# Patient Record
Sex: MALE | Race: BLACK OR AFRICAN AMERICAN | NOT HISPANIC OR LATINO | Employment: FULL TIME | URBAN - METROPOLITAN AREA
[De-identification: names, ages, dates, MRNs, and addresses within clinical notes are randomized per-mention and may not be internally consistent; named-entity substitution may affect disease eponyms.]

---

## 2017-04-30 DIAGNOSIS — E11.9 TYPE 2 DIABETES MELLITUS WITHOUT COMPLICATION: ICD-10-CM

## 2017-05-01 RX ORDER — BLOOD SUGAR DIAGNOSTIC
STRIP MISCELLANEOUS
Qty: 100 STRIP | Refills: 0 | Status: SHIPPED | OUTPATIENT
Start: 2017-05-01 | End: 2017-05-29 | Stop reason: SDUPTHER

## 2017-05-06 DIAGNOSIS — E11.9 TYPE 2 DIABETES MELLITUS WITHOUT COMPLICATION: ICD-10-CM

## 2017-05-07 RX ORDER — DAPAGLIFLOZIN 5 MG/1
TABLET, FILM COATED ORAL
Qty: 90 TABLET | Refills: 0 | Status: SHIPPED | OUTPATIENT
Start: 2017-05-07 | End: 2017-05-29 | Stop reason: SDUPTHER

## 2017-05-29 ENCOUNTER — OFFICE VISIT (OUTPATIENT)
Dept: FAMILY MEDICINE | Facility: HOSPITAL | Age: 42
End: 2017-05-29
Attending: FAMILY MEDICINE
Payer: COMMERCIAL

## 2017-05-29 VITALS
BODY MASS INDEX: 38.13 KG/M2 | DIASTOLIC BLOOD PRESSURE: 93 MMHG | HEART RATE: 89 BPM | HEIGHT: 67 IN | WEIGHT: 242.94 LBS | SYSTOLIC BLOOD PRESSURE: 134 MMHG

## 2017-05-29 DIAGNOSIS — I10 ESSENTIAL HYPERTENSION: ICD-10-CM

## 2017-05-29 DIAGNOSIS — E11.9 TYPE 2 DIABETES MELLITUS WITHOUT COMPLICATION, WITHOUT LONG-TERM CURRENT USE OF INSULIN: ICD-10-CM

## 2017-05-29 DIAGNOSIS — E78.5 HYPERLIPIDEMIA, UNSPECIFIED HYPERLIPIDEMIA TYPE: ICD-10-CM

## 2017-05-29 PROCEDURE — 99213 OFFICE O/P EST LOW 20 MIN: CPT | Performed by: FAMILY MEDICINE

## 2017-05-29 RX ORDER — METFORMIN HYDROCHLORIDE 1000 MG/1
1000 TABLET ORAL 2 TIMES DAILY WITH MEALS
Qty: 180 TABLET | Refills: 3 | Status: SHIPPED | OUTPATIENT
Start: 2017-05-29 | End: 2018-06-21 | Stop reason: SDUPTHER

## 2017-05-29 RX ORDER — LISINOPRIL 40 MG/1
40 TABLET ORAL DAILY
Qty: 90 TABLET | Refills: 3 | Status: SHIPPED | OUTPATIENT
Start: 2017-05-29 | End: 2018-06-21 | Stop reason: SDUPTHER

## 2017-05-29 RX ORDER — HYDROCHLOROTHIAZIDE 25 MG/1
25 TABLET ORAL DAILY
Qty: 90 TABLET | Refills: 3 | Status: SHIPPED | OUTPATIENT
Start: 2017-05-29 | End: 2018-06-21 | Stop reason: SDUPTHER

## 2017-05-29 RX ORDER — ATORVASTATIN CALCIUM 40 MG/1
40 TABLET, FILM COATED ORAL DAILY
Qty: 90 TABLET | Refills: 3 | Status: SHIPPED | OUTPATIENT
Start: 2017-05-29 | End: 2018-06-21

## 2017-05-29 RX ORDER — DAPAGLIFLOZIN 5 MG/1
10 TABLET, FILM COATED ORAL DAILY
Qty: 180 TABLET | Refills: 3 | Status: SHIPPED | OUTPATIENT
Start: 2017-05-29 | End: 2018-06-21 | Stop reason: SDUPTHER

## 2017-05-29 NOTE — PROGRESS NOTES
Subjective:       Patient ID: Pete Castelan is a 42 y.o. male.    Chief Complaint: Annual Exam and Medication Refill    HPI   41 yo male, w/ h/o T2DM, HTN, HLD, presents for medication refill. Patient states he is doing well and would like his medications. His last labs was about 1 year. His A1c was 8.9. BP is stable at home. Denies any complaints at this time.    Review of Systems   Constitutional: Negative for chills and fever.   HENT: Negative for congestion.    Respiratory: Negative for shortness of breath and wheezing.    Cardiovascular: Negative for chest pain, palpitations and leg swelling.   Gastrointestinal: Negative for abdominal pain, constipation, diarrhea, nausea and vomiting.   Genitourinary: Negative for dysuria.   Neurological: Negative for dizziness and headaches.       Objective:      Vitals:    05/29/17 1533   BP: (!) 134/93   Pulse: 89     Physical Exam   Constitutional: He is oriented to person, place, and time. He appears well-developed and well-nourished.   HENT:   Head: Normocephalic and atraumatic.   Neck: Neck supple.   Cardiovascular: Normal rate, regular rhythm, normal heart sounds and intact distal pulses.  Exam reveals no gallop and no friction rub.    No murmur heard.  Pulmonary/Chest: Effort normal and breath sounds normal. He has no wheezes. He has no rales.   Abdominal: Soft. Bowel sounds are normal. He exhibits no distension. There is no tenderness.   Musculoskeletal: He exhibits no edema.   Neurological: He is alert and oriented to person, place, and time.   Skin: Skin is warm and dry.   Psychiatric: He has a normal mood and affect. His behavior is normal. Judgment and thought content normal.       Assessment:       1. Hyperlipidemia, unspecified hyperlipidemia type    2. Type 2 diabetes mellitus without complication, without long-term current use of insulin    3. Essential hypertension        Plan:       Hyperlipidemia, unspecified hyperlipidemia type  -     atorvastatin  (LIPITOR) 40 MG tablet; Take 1 tablet (40 mg total) by mouth once daily.  Dispense: 90 tablet; Refill: 3    Type 2 diabetes mellitus without complication, without long-term current use of insulin  -     Hemoglobin A1c; Future; Expected date: 05/29/2017  -     dapagliflozin (FARXIGA) 5 mg Tab tablet; Take 2 tablets (10 mg total) by mouth once daily.  Dispense: 180 tablet; Refill: 3  -     metformin (GLUCOPHAGE) 1000 MG tablet; Take 1 tablet (1,000 mg total) by mouth 2 (two) times daily with meals.  Dispense: 180 tablet; Refill: 3  -     blood sugar diagnostic (ONETOUCH ULTRA TEST) Strp; TEST ONCE DAILY  Dispense: 100 strip; Refill: 3  -     Hemoglobin A1c; Future; Expected date: 08/27/2017    Essential hypertension  -     CBC auto differential; Future; Expected date: 05/29/2017  -     Comprehensive metabolic panel; Future; Expected date: 05/29/2017  -     TSH; Future; Expected date: 05/29/2017  -     Lipid panel; Future; Expected date: 05/29/2017  -     hydrochlorothiazide (HYDRODIURIL) 25 MG tablet; Take 1 tablet (25 mg total) by mouth once daily.  Dispense: 90 tablet; Refill: 3  -     lisinopril (PRINIVIL,ZESTRIL) 40 MG tablet; Take 1 tablet (40 mg total) by mouth once daily.  Dispense: 90 tablet; Refill: 3      Return in about 3 months (around 8/29/2017).

## 2017-05-30 NOTE — PROGRESS NOTES
I assume primary medical responsibility for this patient, I have reviewed the case history, findings, diagnosis and treatment plan with the resident and agree that the care is reasonable and necessary. This service has been performed by a resident without the presence of a teaching physician under the primary care exception  [address]  5/30/2017

## 2017-08-08 DIAGNOSIS — I10 ESSENTIAL HYPERTENSION: ICD-10-CM

## 2017-08-17 RX ORDER — LISINOPRIL 40 MG/1
TABLET ORAL
Qty: 90 TABLET | Refills: 0 | Status: SHIPPED | OUTPATIENT
Start: 2017-08-17 | End: 2017-10-31 | Stop reason: SDUPTHER

## 2017-10-26 PROCEDURE — 96374 THER/PROPH/DIAG INJ IV PUSH: CPT

## 2017-10-26 PROCEDURE — 93005 ELECTROCARDIOGRAM TRACING: CPT

## 2017-10-26 PROCEDURE — 99285 EMERGENCY DEPT VISIT HI MDM: CPT | Mod: ,,, | Performed by: EMERGENCY MEDICINE

## 2017-10-26 PROCEDURE — 25605 CLTX DST RDL FX/EPHYS SEP W/: CPT | Mod: RT

## 2017-10-26 PROCEDURE — 99285 EMERGENCY DEPT VISIT HI MDM: CPT | Mod: 25,27

## 2017-10-26 PROCEDURE — 99284 EMERGENCY DEPT VISIT MOD MDM: CPT

## 2017-10-27 ENCOUNTER — TELEPHONE (OUTPATIENT)
Dept: ORTHOPEDICS | Facility: CLINIC | Age: 42
End: 2017-10-27

## 2017-10-27 ENCOUNTER — HOSPITAL ENCOUNTER (EMERGENCY)
Facility: HOSPITAL | Age: 42
Discharge: HOME | End: 2017-10-27
Attending: EMERGENCY MEDICINE
Payer: COMMERCIAL

## 2017-10-27 VITALS
HEART RATE: 90 BPM | SYSTOLIC BLOOD PRESSURE: 120 MMHG | BODY MASS INDEX: 37.67 KG/M2 | DIASTOLIC BLOOD PRESSURE: 66 MMHG | OXYGEN SATURATION: 96 % | TEMPERATURE: 98 F | HEIGHT: 67 IN | RESPIRATION RATE: 20 BRPM | WEIGHT: 240 LBS

## 2017-10-27 DIAGNOSIS — Z01.818 PRE-OP EVALUATION: ICD-10-CM

## 2017-10-27 DIAGNOSIS — S52.501A CLOSED FRACTURE OF DISTAL END OF RIGHT RADIUS, UNSPECIFIED FRACTURE MORPHOLOGY, INITIAL ENCOUNTER: Primary | ICD-10-CM
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
## 2017-10-27 DIAGNOSIS — M25.531 RIGHT WRIST PAIN: ICD-10-CM

## 2017-10-27 LAB
ALBUMIN SERPL BCP-MCNC: 3.4 G/DL
ALP SERPL-CCNC: 58 U/L
ALT SERPL W/O P-5'-P-CCNC: 14 U/L
ANION GAP SERPL CALC-SCNC: 12 MMOL/L
AST SERPL-CCNC: 22 U/L
BASOPHILS # BLD AUTO: 0.02 K/UL
BASOPHILS NFR BLD: 0.2 %
BILIRUB SERPL-MCNC: 0.3 MG/DL
BUN SERPL-MCNC: 20 MG/DL
CALCIUM SERPL-MCNC: 8.7 MG/DL
CHLORIDE SERPL-SCNC: 108 MMOL/L
CO2 SERPL-SCNC: 19 MMOL/L
CREAT SERPL-MCNC: 1.2 MG/DL
DIFFERENTIAL METHOD: ABNORMAL
EOSINOPHIL # BLD AUTO: 0.4 K/UL
EOSINOPHIL NFR BLD: 3.4 %
ERYTHROCYTE [DISTWIDTH] IN BLOOD BY AUTOMATED COUNT: 13.7 %
EST. GFR  (AFRICAN AMERICAN): >60 ML/MIN/1.73 M^2
EST. GFR  (NON AFRICAN AMERICAN): >60 ML/MIN/1.73 M^2
GLUCOSE SERPL-MCNC: 168 MG/DL
HCT VFR BLD AUTO: 37.6 %
HGB BLD-MCNC: 11.9 G/DL
IMM GRANULOCYTES # BLD AUTO: 0.03 K/UL
IMM GRANULOCYTES NFR BLD AUTO: 0.3 %
LYMPHOCYTES # BLD AUTO: 1.4 K/UL
LYMPHOCYTES NFR BLD: 13.2 %
MCH RBC QN AUTO: 26.6 PG
MCHC RBC AUTO-ENTMCNC: 31.6 G/DL
MCV RBC AUTO: 84 FL
MONOCYTES # BLD AUTO: 0.7 K/UL
MONOCYTES NFR BLD: 6.7 %
NEUTROPHILS # BLD AUTO: 8 K/UL
NEUTROPHILS NFR BLD: 76.2 %
NRBC BLD-RTO: 0 /100 WBC
PLATELET # BLD AUTO: 253 K/UL
PMV BLD AUTO: 10.3 FL
POTASSIUM SERPL-SCNC: 4.3 MMOL/L
PROT SERPL-MCNC: 7 G/DL
RBC # BLD AUTO: 4.47 M/UL
SODIUM SERPL-SCNC: 139 MMOL/L
WBC # BLD AUTO: 10.43 K/UL

## 2017-10-27 PROCEDURE — 80053 COMPREHEN METABOLIC PANEL: CPT

## 2017-10-27 PROCEDURE — 93010 ELECTROCARDIOGRAM REPORT: CPT | Mod: ,,, | Performed by: INTERNAL MEDICINE

## 2017-10-27 PROCEDURE — 63600175 PHARM REV CODE 636 W HCPCS: Performed by: ORTHOPAEDIC SURGERY

## 2017-10-27 PROCEDURE — 25000003 PHARM REV CODE 250: Performed by: EMERGENCY MEDICINE

## 2017-10-27 PROCEDURE — 85025 COMPLETE CBC W/AUTO DIFF WBC: CPT

## 2017-10-27 PROCEDURE — 25605 CLTX DST RDL FX/EPHYS SEP W/: CPT | Mod: RT

## 2017-10-27 RX ORDER — HYDROCODONE BITARTRATE AND ACETAMINOPHEN 5; 325 MG/1; MG/1
1 TABLET ORAL
Status: COMPLETED | OUTPATIENT
Start: 2017-10-27 | End: 2017-10-27

## 2017-10-27 RX ORDER — LORAZEPAM 2 MG/ML
4 INJECTION INTRAMUSCULAR ONCE
Status: COMPLETED | OUTPATIENT
Start: 2017-10-27 | End: 2017-10-27

## 2017-10-27 RX ORDER — HYDROCODONE BITARTRATE AND ACETAMINOPHEN 5; 325 MG/1; MG/1
1 TABLET ORAL EVERY 4 HOURS PRN
Qty: 18 TABLET | Refills: 0 | Status: SHIPPED | OUTPATIENT
Start: 2017-10-27 | End: 2019-03-04

## 2017-10-27 RX ADMIN — HYDROCODONE BITARTRATE AND ACETAMINOPHEN 1 TABLET: 5; 325 TABLET ORAL at 01:10

## 2017-10-27 RX ADMIN — LORAZEPAM 4 MG: 2 INJECTION INTRAMUSCULAR; INTRAVENOUS at 03:10
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
## 2017-10-27 NOTE — TELEPHONE ENCOUNTER
----- Message from Mary Anne Myers MD sent at 10/27/2017  4:51 AM CDT -----  Pt needs f/u in one week for operative distal radius fracture.
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
## 2017-10-30 ENCOUNTER — OFFICE VISIT (OUTPATIENT)
Dept: ORTHOPEDICS | Facility: CLINIC | Age: 42
End: 2017-10-30
Payer: COMMERCIAL

## 2017-10-30 VITALS
WEIGHT: 240 LBS | RESPIRATION RATE: 18 BRPM | HEIGHT: 67 IN | HEART RATE: 88 BPM | DIASTOLIC BLOOD PRESSURE: 93 MMHG | SYSTOLIC BLOOD PRESSURE: 144 MMHG | BODY MASS INDEX: 37.67 KG/M2

## 2017-10-30 DIAGNOSIS — S52.124A CLOSED NONDISPLACED FRACTURE OF HEAD OF RIGHT RADIUS, INITIAL ENCOUNTER: ICD-10-CM

## 2017-10-30 DIAGNOSIS — S52.501A CLOSED FRACTURE OF DISTAL END OF RIGHT RADIUS, UNSPECIFIED FRACTURE MORPHOLOGY, INITIAL ENCOUNTER: Primary | ICD-10-CM

## 2017-10-30 DIAGNOSIS — S52.101A CLOSED FRACTURE OF PROXIMAL END OF RIGHT RADIUS, UNSPECIFIED FRACTURE MORPHOLOGY, INITIAL ENCOUNTER: Primary | ICD-10-CM

## 2017-10-30 PROCEDURE — 99204 OFFICE O/P NEW MOD 45 MIN: CPT | Mod: 57,S$GLB,, | Performed by: ORTHOPAEDIC SURGERY

## 2017-10-30 PROCEDURE — 99999 PR PBB SHADOW E&M-EST. PATIENT-LVL III: CPT | Mod: PBBFAC,,, | Performed by: ORTHOPAEDIC SURGERY

## 2017-10-31 ENCOUNTER — TELEPHONE (OUTPATIENT)
Dept: ORTHOPEDICS | Facility: CLINIC | Age: 42
End: 2017-10-31

## 2017-10-31 RX ORDER — ACETAMINOPHEN 500 MG
1000 TABLET ORAL EVERY 6 HOURS PRN
COMMUNITY
End: 2019-03-04

## 2017-10-31 RX ORDER — ASCORBIC ACID 500 MG
500 TABLET ORAL EVERY MORNING
COMMUNITY

## 2017-10-31 RX ORDER — IBUPROFEN 200 MG
800 TABLET ORAL EVERY MORNING
Status: ON HOLD | COMMUNITY
End: 2017-11-01 | Stop reason: HOSPADM

## 2017-10-31 NOTE — TELEPHONE ENCOUNTER
----- Message from Corine Acosta sent at 10/31/2017  3:46 PM CDT -----  Contact: pt  _  1st Request  _  2nd Request  _  3rd Request        Who: pt    Why: Requesting a call back in regards to needs information on surgery. Please call pt     What Number to Call Back:[phone]    When to Expect a call back: (Within 24 hours)    Please return the call at earliest convenience. Thanks!

## 2017-10-31 NOTE — PRE-PROCEDURE INSTRUCTIONS
"Spoke with Patient.  NPO, medication, and pre-op instructions reviewed.  Denies previous problems with Anesthesia.  Stated that his morning glucose readings have been "ok" ranging from 150 - 180.  Did not adjust his evening dose of Metformin the evening before surgery.  Has been taking 800 mg of Ibuprofen every morning.  Instructed to use Extra Strength Tylenol as needed.  Instructed that he can take two tablets the morning of surgery.  Verbalized understanding of instructions.    "
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
## 2017-10-31 NOTE — TELEPHONE ENCOUNTER
Received a call from surgery center while NoeFlip was on the other line to get his arrival time for surgery tomorrow @ 7:00am.

## 2017-11-01 ENCOUNTER — ANESTHESIA (OUTPATIENT)
Dept: SURGERY | Facility: HOSPITAL | Age: 42
End: 2017-11-01
Payer: COMMERCIAL

## 2017-11-01 ENCOUNTER — ANESTHESIA EVENT (OUTPATIENT)
Dept: SURGERY | Facility: HOSPITAL | Age: 42
End: 2017-11-01
Payer: COMMERCIAL

## 2017-11-01 ENCOUNTER — HOSPITAL ENCOUNTER (OUTPATIENT)
Facility: HOSPITAL | Age: 42
Discharge: HOME | End: 2017-11-01
Attending: ORTHOPAEDIC SURGERY | Admitting: ORTHOPAEDIC SURGERY
Payer: COMMERCIAL

## 2017-11-01 ENCOUNTER — SURGERY (OUTPATIENT)
Age: 42
End: 2017-11-01

## 2017-11-01 VITALS
WEIGHT: 240 LBS | RESPIRATION RATE: 18 BRPM | BODY MASS INDEX: 37.67 KG/M2 | DIASTOLIC BLOOD PRESSURE: 81 MMHG | HEART RATE: 85 BPM | SYSTOLIC BLOOD PRESSURE: 129 MMHG | HEIGHT: 67 IN | OXYGEN SATURATION: 99 % | TEMPERATURE: 98 F

## 2017-11-01 DIAGNOSIS — S52.501A DISTAL RADIUS FRACTURE, RIGHT: ICD-10-CM

## 2017-11-01 LAB
POCT GLUCOSE: 222 MG/DL (ref 70–110)
POCT GLUCOSE: 247 MG/DL (ref 70–110)

## 2017-11-01 PROCEDURE — C1769 GUIDE WIRE: HCPCS | Performed by: ORTHOPAEDIC SURGERY

## 2017-11-01 PROCEDURE — 25000003 PHARM REV CODE 250: Performed by: NURSE ANESTHETIST, CERTIFIED REGISTERED

## 2017-11-01 PROCEDURE — 37000008 HC ANESTHESIA 1ST 15 MINUTES: Performed by: ORTHOPAEDIC SURGERY

## 2017-11-01 PROCEDURE — 25609 OPTX DST RD XART FX/EP SEP3+: CPT | Mod: RT,,, | Performed by: ORTHOPAEDIC SURGERY

## 2017-11-01 PROCEDURE — 63600175 PHARM REV CODE 636 W HCPCS: Performed by: NURSE ANESTHETIST, CERTIFIED REGISTERED

## 2017-11-01 PROCEDURE — 25000003 PHARM REV CODE 250: Performed by: ORTHOPAEDIC SURGERY

## 2017-11-01 PROCEDURE — 27201423 OPTIME MED/SURG SUP & DEVICES STERILE SUPPLY: Performed by: ORTHOPAEDIC SURGERY

## 2017-11-01 PROCEDURE — 82962 GLUCOSE BLOOD TEST: CPT | Performed by: ORTHOPAEDIC SURGERY

## 2017-11-01 PROCEDURE — 37000009 HC ANESTHESIA EA ADD 15 MINS: Performed by: ORTHOPAEDIC SURGERY

## 2017-11-01 PROCEDURE — 36000711: Performed by: ORTHOPAEDIC SURGERY

## 2017-11-01 PROCEDURE — 76942 ECHO GUIDE FOR BIOPSY: CPT | Performed by: ANESTHESIOLOGY

## 2017-11-01 PROCEDURE — 36000710: Performed by: ORTHOPAEDIC SURGERY

## 2017-11-01 PROCEDURE — 63600175 PHARM REV CODE 636 W HCPCS: Performed by: ORTHOPAEDIC SURGERY

## 2017-11-01 PROCEDURE — 64415 NJX AA&/STRD BRCH PLXS IMG: CPT | Mod: 59,RT,, | Performed by: ANESTHESIOLOGY

## 2017-11-01 PROCEDURE — D9220A PRA ANESTHESIA: Mod: CRNA,,, | Performed by: NURSE ANESTHETIST, CERTIFIED REGISTERED

## 2017-11-01 PROCEDURE — 63600175 PHARM REV CODE 636 W HCPCS: Performed by: ANESTHESIOLOGY

## 2017-11-01 PROCEDURE — 71000033 HC RECOVERY, INTIAL HOUR: Performed by: ORTHOPAEDIC SURGERY

## 2017-11-01 PROCEDURE — C1713 ANCHOR/SCREW BN/BN,TIS/BN: HCPCS | Performed by: ORTHOPAEDIC SURGERY

## 2017-11-01 PROCEDURE — 71000015 HC POSTOP RECOV 1ST HR: Performed by: ORTHOPAEDIC SURGERY

## 2017-11-01 PROCEDURE — D9220A PRA ANESTHESIA: Mod: ANES,,, | Performed by: ANESTHESIOLOGY

## 2017-11-01 DEVICE — SCREW BNE N LOK HEXLB 3.5X14: Type: IMPLANTABLE DEVICE | Site: WRIST | Status: FUNCTIONAL

## 2017-11-01 DEVICE — SCREW BONE 2.3 X 18MM: Type: IMPLANTABLE DEVICE | Site: WRIST | Status: FUNCTIONAL

## 2017-11-01 DEVICE — SCREW BONE LOK CONG 2.3X24MM: Type: IMPLANTABLE DEVICE | Site: WRIST | Status: FUNCTIONAL

## 2017-11-01 DEVICE — PLATE BONE DST VOLAR RAD STND: Type: IMPLANTABLE DEVICE | Site: WRIST | Status: FUNCTIONAL

## 2017-11-01 DEVICE — SCREW BONE LOK CONG 2.3X22MM: Type: IMPLANTABLE DEVICE | Site: WRIST | Status: FUNCTIONAL

## 2017-11-01 DEVICE — SCREW BONE NL HEXALOBE 3.5 X 1: Type: IMPLANTABLE DEVICE | Site: WRIST | Status: FUNCTIONAL

## 2017-11-01 RX ORDER — BACITRACIN ZINC 500 UNIT/G
OINTMENT (GRAM) TOPICAL
Status: DISCONTINUED | OUTPATIENT
Start: 2017-11-01 | End: 2017-11-01 | Stop reason: HOSPADM

## 2017-11-01 RX ORDER — PHENYLEPHRINE HYDROCHLORIDE 10 MG/ML
INJECTION INTRAVENOUS
Status: DISCONTINUED | OUTPATIENT
Start: 2017-11-01 | End: 2017-11-01

## 2017-11-01 RX ORDER — LIDOCAINE HCL/PF 100 MG/5ML
SYRINGE (ML) INTRAVENOUS
Status: DISCONTINUED | OUTPATIENT
Start: 2017-11-01 | End: 2017-11-01

## 2017-11-01 RX ORDER — PROPOFOL 10 MG/ML
VIAL (ML) INTRAVENOUS CONTINUOUS PRN
Status: DISCONTINUED | OUTPATIENT
Start: 2017-11-01 | End: 2017-11-01

## 2017-11-01 RX ORDER — BACITRACIN 500 [USP'U]/G
OINTMENT TOPICAL
Status: DISCONTINUED
Start: 2017-11-01 | End: 2017-11-01 | Stop reason: HOSPADM

## 2017-11-01 RX ORDER — MIDAZOLAM HYDROCHLORIDE 1 MG/ML
0.5 INJECTION INTRAMUSCULAR; INTRAVENOUS EVERY 5 MIN PRN
Status: DISCONTINUED | OUTPATIENT
Start: 2017-11-01 | End: 2017-11-01

## 2017-11-01 RX ORDER — PROPOFOL 10 MG/ML
VIAL (ML) INTRAVENOUS
Status: DISCONTINUED | OUTPATIENT
Start: 2017-11-01 | End: 2017-11-01

## 2017-11-01 RX ORDER — MIDAZOLAM HYDROCHLORIDE 1 MG/ML
INJECTION, SOLUTION INTRAMUSCULAR; INTRAVENOUS
Status: DISCONTINUED | OUTPATIENT
Start: 2017-11-01 | End: 2017-11-01

## 2017-11-01 RX ORDER — ONDANSETRON 2 MG/ML
4 INJECTION INTRAMUSCULAR; INTRAVENOUS EVERY 12 HOURS PRN
Status: DISCONTINUED | OUTPATIENT
Start: 2017-11-01 | End: 2017-11-01 | Stop reason: HOSPADM

## 2017-11-01 RX ORDER — MIDAZOLAM HYDROCHLORIDE 1 MG/ML
0.5 INJECTION INTRAMUSCULAR; INTRAVENOUS EVERY 5 MIN PRN
Status: DISCONTINUED | OUTPATIENT
Start: 2017-11-01 | End: 2017-11-01 | Stop reason: HOSPADM

## 2017-11-01 RX ORDER — MORPHINE SULFATE 2 MG/ML
3 INJECTION, SOLUTION INTRAMUSCULAR; INTRAVENOUS
Status: DISCONTINUED | OUTPATIENT
Start: 2017-11-01 | End: 2017-11-01 | Stop reason: HOSPADM

## 2017-11-01 RX ORDER — SODIUM CHLORIDE 0.9 % (FLUSH) 0.9 %
3 SYRINGE (ML) INJECTION
Status: DISCONTINUED | OUTPATIENT
Start: 2017-11-01 | End: 2017-11-01 | Stop reason: HOSPADM

## 2017-11-01 RX ORDER — HYDROCODONE BITARTRATE AND ACETAMINOPHEN 5; 325 MG/1; MG/1
1 TABLET ORAL EVERY 4 HOURS PRN
Status: DISCONTINUED | OUTPATIENT
Start: 2017-11-01 | End: 2017-11-01 | Stop reason: HOSPADM

## 2017-11-01 RX ORDER — FENTANYL CITRATE 50 UG/ML
25 INJECTION, SOLUTION INTRAMUSCULAR; INTRAVENOUS EVERY 5 MIN PRN
Status: DISCONTINUED | OUTPATIENT
Start: 2017-11-01 | End: 2017-11-01

## 2017-11-01 RX ORDER — SODIUM CHLORIDE 9 MG/ML
INJECTION, SOLUTION INTRAVENOUS CONTINUOUS
Status: DISCONTINUED | OUTPATIENT
Start: 2017-11-01 | End: 2017-11-01 | Stop reason: HOSPADM

## 2017-11-01 RX ORDER — ONDANSETRON 2 MG/ML
INJECTION INTRAMUSCULAR; INTRAVENOUS
Status: DISCONTINUED | OUTPATIENT
Start: 2017-11-01 | End: 2017-11-01

## 2017-11-01 RX ORDER — FENTANYL CITRATE 50 UG/ML
25 INJECTION, SOLUTION INTRAMUSCULAR; INTRAVENOUS EVERY 5 MIN PRN
Status: DISCONTINUED | OUTPATIENT
Start: 2017-11-01 | End: 2017-11-01 | Stop reason: HOSPADM

## 2017-11-01 RX ORDER — MUPIROCIN 20 MG/G
OINTMENT TOPICAL
Status: DISCONTINUED | OUTPATIENT
Start: 2017-11-01 | End: 2017-11-01 | Stop reason: HOSPADM

## 2017-11-01 RX ORDER — MUPIROCIN 20 MG/G
1 OINTMENT TOPICAL 2 TIMES DAILY
Status: DISCONTINUED | OUTPATIENT
Start: 2017-11-01 | End: 2017-11-01 | Stop reason: HOSPADM

## 2017-11-01 RX ORDER — OXYCODONE AND ACETAMINOPHEN 5; 325 MG/1; MG/1
1 TABLET ORAL EVERY 4 HOURS PRN
Qty: 45 TABLET | Refills: 0 | Status: SHIPPED | OUTPATIENT
Start: 2017-11-01 | End: 2019-03-04

## 2017-11-01 RX ORDER — LIDOCAINE HYDROCHLORIDE 10 MG/ML
1 INJECTION, SOLUTION EPIDURAL; INFILTRATION; INTRACAUDAL; PERINEURAL ONCE
Status: COMPLETED | OUTPATIENT
Start: 2017-11-01 | End: 2017-11-01

## 2017-11-01 RX ADMIN — PHENYLEPHRINE HYDROCHLORIDE 100 MCG: 10 INJECTION INTRAVENOUS at 10:11

## 2017-11-01 RX ADMIN — ONDANSETRON 4 MG: 2 INJECTION INTRAMUSCULAR; INTRAVENOUS at 09:11

## 2017-11-01 RX ADMIN — BACITRACIN ZINC 1 TUBE: 500 OINTMENT TOPICAL at 10:11

## 2017-11-01 RX ADMIN — FENTANYL CITRATE 50 MCG: 50 INJECTION INTRAMUSCULAR; INTRAVENOUS at 08:11

## 2017-11-01 RX ADMIN — SODIUM CHLORIDE: 0.9 INJECTION, SOLUTION INTRAVENOUS at 08:11

## 2017-11-01 RX ADMIN — PROPOFOL 40 MG: 10 INJECTION, EMULSION INTRAVENOUS at 09:11

## 2017-11-01 RX ADMIN — LIDOCAINE HYDROCHLORIDE 50 MG: 20 INJECTION, SOLUTION INTRAVENOUS at 09:11

## 2017-11-01 RX ADMIN — MIDAZOLAM HYDROCHLORIDE 2 MG: 1 INJECTION, SOLUTION INTRAMUSCULAR; INTRAVENOUS at 08:11

## 2017-11-01 RX ADMIN — SODIUM CHLORIDE, SODIUM GLUCONATE, SODIUM ACETATE, POTASSIUM CHLORIDE, MAGNESIUM CHLORIDE, SODIUM PHOSPHATE, DIBASIC, AND POTASSIUM PHOSPHATE: .53; .5; .37; .037; .03; .012; .00082 INJECTION, SOLUTION INTRAVENOUS at 09:11

## 2017-11-01 RX ADMIN — VANCOMYCIN HYDROCHLORIDE 1000 MG: 1 INJECTION, POWDER, LYOPHILIZED, FOR SOLUTION INTRAVENOUS at 08:11

## 2017-11-01 RX ADMIN — PROPOFOL 100 MCG/KG/MIN: 10 INJECTION, EMULSION INTRAVENOUS at 09:11

## 2017-11-01 RX ADMIN — MUPIROCIN: 20 OINTMENT TOPICAL at 08:11

## 2017-11-01 RX ADMIN — LIDOCAINE HYDROCHLORIDE 1 MG: 10 INJECTION, SOLUTION EPIDURAL; INFILTRATION; INTRACAUDAL; PERINEURAL at 08:11
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
## 2017-11-01 NOTE — PLAN OF CARE
D/C orders noted. Pt. to see Opthalmology today. Pt. will schedule her own appt. for PCP at [facility].        11/01/17 1312   Final Note   Assessment Type Final Discharge Note   Discharge Disposition Home   Hospital Follow Up  Appt(s) scheduled? Yes   Discharge plans and expectations educations in teach back method with documentation complete? Yes   Right Care Referral Info   Post Acute Recommendation No Care

## 2017-11-01 NOTE — BRIEF OP NOTE
[facility]  Brief Operative Note     SUMMARY     Surgery Date: 11/1/2017     Surgeon(s) and Role:     * Melanie Betancourt MD - Primary     * Mary Anne Myers MD - Resident - Assisting        Pre-op Diagnosis:  Closed nondisplaced fracture of head of right radius, initial encounter [S52.124A]    Post-op Diagnosis:  Post-Op Diagnosis Codes:     * Closed nondisplaced fracture of head of right radius, initial encounter [S52.124A]    Procedure(s) (LRB):  OPEN REDUCTION INTERNAL FIXATION-RADIUS - RIGHT (Right)    Anesthesia: Regional    Description of the findings of the procedure: see op note    Findings/Key Components: see op note    Estimated Blood Loss: * No values recorded between 11/1/2017  9:32 AM and 11/1/2017 10:49 AM *         Specimens:   Specimen (12h ago through future)    None          Discharge Note    SUMMARY     Admit Date: 11/1/2017    Discharge Date and Time:  11/01/2017 10:55 AM    Hospital Course (synopsis of major diagnoses, care, treatment, and services provided during the course of the hospital stay): Patient presented for above procedure.  Tolerated it well and was discharged home POD0 after voiding, tolerating diet, ambulating, pain controlled.  Discharge instructions, follow-up appointment, and med rec are below.       Final Diagnosis: Post-Op Diagnosis Codes:     * Closed nondisplaced fracture of head of right radius, initial encounter [S52.124A]    Disposition: Home or Self Care    Follow Up/Patient Instructions: f/u in orthopedic hand clinic in 2 weeks    Medications:  Reconciled Home Medications:   Current Discharge Medication List      CONTINUE these medications which have NOT CHANGED    Details   acetaminophen (TYLENOL) 500 MG tablet Take 1,000 mg by mouth every 6 (six) hours as needed for Pain.      ascorbic acid, vitamin C, (VITAMIN C) 500 MG tablet Take 500 mg by mouth every morning.      atorvastatin (LIPITOR) 40 MG tablet Take 1 tablet (40 mg total) by  mouth once daily.  Qty: 90 tablet, Refills: 3    Associated Diagnoses: Hyperlipidemia, unspecified hyperlipidemia type      blood sugar diagnostic (ONETOUCH ULTRA TEST) Strp TEST ONCE DAILY  Qty: 100 strip, Refills: 3    Associated Diagnoses: Type 2 diabetes mellitus without complication, without long-term current use of insulin      dapagliflozin (FARXIGA) 5 mg Tab tablet Take 2 tablets (10 mg total) by mouth once daily.  Qty: 180 tablet, Refills: 3    Associated Diagnoses: Type 2 diabetes mellitus without complication, without long-term current use of insulin      hydrochlorothiazide (HYDRODIURIL) 25 MG tablet Take 1 tablet (25 mg total) by mouth once daily.  Qty: 90 tablet, Refills: 3    Associated Diagnoses: Essential hypertension      hydrocodone-acetaminophen 5-325mg (NORCO) 5-325 mg per tablet Take 1 tablet by mouth every 4 (four) hours as needed for Pain.  Qty: 18 tablet, Refills: 0      ibuprofen (ADVIL,MOTRIN) 200 MG tablet Take 800 mg by mouth every morning. Takes four 200 mg (800 mg) every morning      lisinopril (PRINIVIL,ZESTRIL) 40 MG tablet Take 1 tablet (40 mg total) by mouth once daily.  Qty: 90 tablet, Refills: 3    Associated Diagnoses: Essential hypertension      metformin (GLUCOPHAGE) 1000 MG tablet Take 1 tablet (1,000 mg total) by mouth 2 (two) times daily with meals.  Qty: 180 tablet, Refills: 3    Associated Diagnoses: Type 2 diabetes mellitus without complication, without long-term current use of insulin           No discharge procedures on file.

## 2017-11-01 NOTE — ANESTHESIA PROCEDURE NOTES
Supraclavicular Brachial Plexus Single Injection Block    Patient location during procedure: pre-op   Block not for primary anesthetic.  Reason for block: at surgeon's request and post-op pain management   Post-op Pain Location: right wrist pain   Staffing  Anesthesiologist: FLORIN SWANSON  Resident/CRNA: NATHANAEL RAE  Performed: resident/CRNA   Preanesthetic Checklist  Completed: patient identified, site marked, surgical consent, pre-op evaluation, timeout performed, IV checked, risks and benefits discussed and monitors and equipment checked  Peripheral Block  Patient position: supine  Prep: ChloraPrep  Patient monitoring: heart rate, cardiac monitor, continuous pulse ox, continuous capnometry and frequent blood pressure checks  Block type: supraclavicular  Laterality: right  Injection technique: single shot  Needle  Needle type: Stimuplex   Needle gauge: 22 G  Needle length: 2 in  Needle localization: anatomical landmarks and ultrasound guidance   -ultrasound image captured on disc.  Assessment  Injection assessment: negative aspiration, negative parasthesia and local visualized surrounding nerve  Paresthesia pain: none  Heart rate change: no  Slow fractionated injection: yes  Medications:  Bolus administered: 30 mL of 0.5 ropivacaine  Epinephrine added: 3.75 mcg/mL (1/300,000)  Additional Notes  VSS.  [facility] RN monitoring vitals throughout procedure.  Patient tolerated procedure well.

## 2017-11-01 NOTE — ANESTHESIA POSTPROCEDURE EVALUATION
"Anesthesia Post Evaluation    Patient: Pete Castelan    Procedure(s) Performed: Procedure(s) (LRB):  OPEN REDUCTION INTERNAL FIXATION-RADIUS - RIGHT (Right)    Final Anesthesia Type: general  Patient location during evaluation: PACU  Patient participation: Yes- Able to Participate  Level of consciousness: awake and alert  Post-procedure vital signs: reviewed and stable  Pain management: adequate  Airway patency: patent  PONV status at discharge: No PONV  Anesthetic complications: no      Cardiovascular status: blood pressure returned to baseline  Respiratory status: unassisted  Hydration status: euvolemic  Follow-up not needed.        Visit Vitals  /78 (BP Location: Left arm, Patient Position: Lying)   Pulse 84   Temp 36.5 °C (97.7 °F) (Temporal)   Resp 18   Ht 5' 7" (1.702 m)   Wt 108.9 kg (240 lb)   SpO2 99%   BMI 37.59 kg/m²       Pain/Jw Score: Pain Assessment Performed: Yes (11/1/2017 10:51 AM)  Presence of Pain: denies (11/1/2017 10:51 AM)  Pain Rating Prior to Med Admin: 0 (11/1/2017  8:36 AM)  Jw Score: 10 (11/1/2017 10:51 AM)      "

## 2017-11-01 NOTE — ANESTHESIA PREPROCEDURE EVALUATION
11/01/2017  Pete Castelan is a 42 y.o., male.  Pre-operative evaluation for Procedure(s) (LRB):  OPEN REDUCTION INTERNAL FIXATION-RADIUS - RIGHT (Right)    Pete Castelan is a 42 y.o. male     Patient Active Problem List   Diagnosis    Essential hypertension    Type 2 diabetes mellitus without complication    HLD (hyperlipidemia)    Distal radius fracture, right       Review of patient's allergies indicates:   Allergen Reactions    Penicillins      Was told by his Mother that is allergic        No current facility-administered medications on file prior to encounter.      Current Outpatient Prescriptions on File Prior to Encounter   Medication Sig Dispense Refill    atorvastatin (LIPITOR) 40 MG tablet Take 1 tablet (40 mg total) by mouth once daily. (Patient taking differently: Take 40 mg by mouth nightly. ) 90 tablet 3    blood sugar diagnostic (ONETOUCH ULTRA TEST) Strp TEST ONCE DAILY 100 strip 3    dapagliflozin (FARXIGA) 5 mg Tab tablet Take 2 tablets (10 mg total) by mouth once daily. (Patient taking differently: Take 5 mg by mouth every morning. ) 180 tablet 3    hydrochlorothiazide (HYDRODIURIL) 25 MG tablet Take 1 tablet (25 mg total) by mouth once daily. (Patient taking differently: Take 25 mg by mouth nightly. ) 90 tablet 3    hydrocodone-acetaminophen 5-325mg (NORCO) 5-325 mg per tablet Take 1 tablet by mouth every 4 (four) hours as needed for Pain. 18 tablet 0    lisinopril (PRINIVIL,ZESTRIL) 40 MG tablet Take 1 tablet (40 mg total) by mouth once daily. (Patient taking differently: Take 40 mg by mouth every morning. ) 90 tablet 3    metformin (GLUCOPHAGE) 1000 MG tablet Take 1 tablet (1,000 mg total) by mouth 2 (two) times daily with meals. 180 tablet 3       Past Surgical History:   Procedure Laterality Date    WRIST FRACTURE SURGERY         Social History     Social History     Marital status:      Spouse name: N/A    Number of children: N/A    Years of education: N/A     Occupational History    Not on file.     Social History Main Topics    Smoking status: Never Smoker    Smokeless tobacco: Not on file    Alcohol use Not on file    Drug use: No    Sexual activity: Not on file     Other Topics Concern    Not on file     Social History Narrative    No narrative on file         Vital Signs Range (Last 24H):  Temp:  [36.4 °C (97.6 °F)]   Pulse:  [94]   Resp:  [18]   BP: (148)/(86)   SpO2:  [100 %]       CBC: No results for input(s): WBC, RBC, HGB, HCT, PLT, MCV, MCH, MCHC in the last 72 hours.    CMP: No results for input(s): NA, K, CL, CO2, BUN, CREATININE, GLU, MG, PHOS, CALCIUM, ALBUMIN, PROT, ALKPHOS, ALT, AST, BILITOT in the last 72 hours.    INR  No results for input(s): INR, PROTIME, APTT in the last 72 hours.    Invalid input(s): PT        Diagnostic Studies:      [facility] Echo:      Anesthesia Evaluation    I have reviewed the Patient Summary Reports.    I have reviewed the Nursing Notes.   I have reviewed the Medications.     Review of Systems  Anesthesia Hx:  History of prior surgery of interest to airway management or planning: Denies Family Hx of Anesthesia complications.   Denies Personal Hx of Anesthesia complications.   Cardiovascular:   Hypertension    Pulmonary:  Pulmonary Normal    Hepatic/GI:   Denies GERD.    Endocrine:   Diabetes, type 2        Physical Exam  General:  Obesity    Airway/Jaw/Neck:  Airway Findings: Mouth Opening: Normal Tongue: Normal  Mallampati: II  TM Distance: Normal, at least 6 cm      Dental:  Dental Findings: In tact   Chest/Lungs:  Chest/Lungs Findings: Clear to auscultation, Normal Respiratory Rate     Heart/Vascular:  Heart Findings: Rate: Normal  Rhythm: Regular Rhythm  Sounds: Normal             Anesthesia Plan  Type of Anesthesia, risks & benefits discussed:  Anesthesia Type:  general, regional  Patient's Preference:    Intra-op Monitoring Plan: standard ASA monitors  Intra-op Monitoring Plan Comments:   Post Op Pain Control Plan:   Post Op Pain Control Plan Comments:   Induction:   IV  Beta Blocker:  Patient is not currently on a Beta-Blocker (No further documentation required).       Informed Consent: Patient understands risks and agrees with Anesthesia plan.  Questions answered. Anesthesia consent signed with patient.  ASA Score: 2     Day of Surgery Review of History & Physical:    H&P update referred to the surgeon.         Ready For Surgery From Anesthesia Perspective.

## 2017-11-01 NOTE — ANESTHESIA POSTPROCEDURE EVALUATION
"Anesthesia Post Evaluation    Patient: Pete Castelan    Procedure(s) Performed: Procedure(s) (LRB):  OPEN REDUCTION INTERNAL FIXATION-RADIUS - RIGHT (Right)    [facility] Anesthesia Post Op Evaluation    Visit Vitals  /78 (BP Location: Left arm, Patient Position: Lying)   Pulse 84   Temp 36.5 °C (97.7 °F) (Temporal)   Resp 18   Ht 5' 7" (1.702 m)   Wt 108.9 kg (240 lb)   SpO2 99%   BMI 37.59 kg/m²       Pain/Jw Score: Pain Assessment Performed: Yes (11/1/2017 10:51 AM)  Presence of Pain: denies (11/1/2017 10:51 AM)  Pain Rating Prior to Med Admin: 0 (11/1/2017  8:36 AM)  Jw Score: 10 (11/1/2017 10:51 AM)      "

## 2017-11-01 NOTE — ANESTHESIA RELEASE NOTE
"Anesthesia Release from PACU Note    Patient: Pete Castelan    Procedure(s) Performed: Procedure(s) (LRB):  OPEN REDUCTION INTERNAL FIXATION-RADIUS - RIGHT (Right)    Anesthesia type: general    Post pain: Adequate analgesia    Post assessment: no apparent anesthetic complications    Last Vitals:   Visit Vitals  /78 (BP Location: Left arm, Patient Position: Lying)   Pulse 84   Temp 36.5 °C (97.7 °F) (Temporal)   Resp 18   Ht 5' 7" (1.702 m)   Wt 108.9 kg (240 lb)   SpO2 99%   BMI 37.59 kg/m²       Post vital signs: stable    Level of consciousness: awake    Nausea/Vomiting: no nausea/no vomiting    Complications: none    Airway Patency: patent    Respiratory: unassisted       Cardiovascular: stable    Hydration: euvolemic  "

## 2017-11-01 NOTE — TRANSFER OF CARE
"Anesthesia Transfer of Care Note    Patient: Pete Castelan    Procedure(s) Performed: Procedure(s) (LRB):  OPEN REDUCTION INTERNAL FIXATION-RADIUS - RIGHT (Right)    Patient location: PACU    Anesthesia Type: general    Transport from OR: Transported from OR on room air with adequate spontaneous ventilation    Post pain: adequate analgesia    Post assessment: no apparent anesthetic complications    Post vital signs: stable    Level of consciousness: awake, alert and oriented    Nausea/Vomiting: no nausea/vomiting    Complications: none    Transfer of care protocol was followed      Last vitals:   Visit Vitals  BP (!) 144/68 (BP Location: Left arm, Patient Position: Lying)   Pulse 95   Temp 36.4 °C (97.6 °F) (Oral)   Resp 16   Ht 5' 7" (1.702 m)   Wt 108.9 kg (240 lb)   SpO2 100%   BMI 37.59 kg/m²     "

## 2017-11-01 NOTE — DISCHARGE INSTRUCTIONS
Having Arm Fracture Open Reduction and Internal Fixation (ORIF)  Open reduction and internal fixation (ORIF) is a type of treatment to fix a broken bone. It puts the pieces of a broken bone back together so they can heal. Open reduction means the bones are put back in place during surgery. Internal fixation means that special hardware is used to hold the bone pieces together. This helps the bone heal correctly. The procedure is done by an orthopedic surgeon. This is a doctor with special training in treating bone, joint, and muscle problems.    What to tell your healthcare provider  Make sure you tell the healthcare provider about all medicines you take, including over-the-counter medicines, such as aspirin. Tell him or her about all vitamins, herbs, and other supplements you take. Also tell the provider the last time you had something to eat or drink. And tell your healthcare provider if you:  · Have had any recent changes in your health, such as an infection or fever  Are sensitive or allergic to any medicines, latex, tape, or anesthesia (local and general)    Tests before your surgery  Before your surgery, you may need imaging tests. These may include ultrasound, X-rays, or MRI.    Getting ready for your surgery  ORIF often takes place as emergency surgery after an accident or injury. Youll have a physical exam. X-rays may be taken of your arm. You may also have other imaging tests. A healthcare provider will ask you questions. He or she will also check your heart and lungs.  In some cases, arm fracture ORIF is planned. You may need to stop taking some medicines before the procedure, such as blood thinners and aspirin. If you smoke, you may need to stop before your surgery. Smoking can delay healing. Talk with your healthcare provider if you need help to stop smoking.  Also make sure to:  · Ask a family member or friend to take you home from the hospital. You cannot drive yourself.  · Plan some changes at home  to help you recover. You may need help at home.  · Dont eat or drink after midnight the night before your surgery.  · Follow all other instructions from your healthcare provider.  You may be asked to sign a consent form that gives your permission to do the procedure. Read the form carefully. Ask questions if something is not clear.    On the day of surgery  Your surgeon will explain the details of your surgery. The preparation and surgery may take a couple of hours. In general, you can expect the following:  · You will likely have general anesthesia.This will prevent pain and make you sleep through the surgery. Or you may have regional anesthesia to numb the area and medicine to help you relax and sleep through the surgery.  · A healthcare provider watches your vital signs, like your heart rate and blood pressure, during the surgery.  · After cleaning the skin, your surgeon makes a cut (incision) through the skin and muscles of your arm. Or with some fractures, the surgeon makes an incision on the top of the shoulder.  · Your surgeon puts the pieces of your humerus back in place. This is the reduction.  · Next, your surgeon uses special screws, plates, wires, or nails to hold the bone pieces together. This is the fixation. It helps the bone heals correctly.  · The surgeon will make other repairs to the area as needed.  · The surgeon will close the layers of muscle and skin on your arm with stitches (sutures).    After your surgery  Talk with your surgeon about what you can expect after your surgery. You may go home the same day. Or you may stay overnight in the hospital. Before leaving the hospital, you will have X-rays taken of your arm. This is to check the repair.  You might have some fluid draining from your incision. This is normal. You will have some pain after the surgery. Your doctor will tell you what pain medicine you can take to help reduce the pain. Avoid certain OTC medicines for pain as instructed.  Some of these may interfere with bone healing. You can also use ice packs to help lessen pain and swelling.  You may be told to not move your arm for a while after your surgery. You may need to wear a splint for several weeks. You will get instructions about when and how you can move your arm. Your surgeon may also tell you to eat foods high in calcium and vitamin D to help with bone healing.    Follow-up care  Make sure to keep all of your follow-up appointments. You may need to have your stitches removed a week or so after your surgery.  You may have physical therapy to improve the strength and movement of your arm. The therapy may include treatments and exercises. The therapy improves your chances of a full recovery. Most people are able to return to all their normal activities within a few months.     When to call your healthcare provider  Call your health care provider right away if you have any of these:  · Fever of 100.4°F (38°C) or higher  · Redness, swelling, or fluid leaking from your incision that gets worse  · Pain that gets worse  · Loss of feeling in your arm or hand       Understanding Peripheral Nerve Blocks (PNBs)  Regional anesthesia is medicine that numbs a section of your body. Peripheral nerve blocks (PNBs) are a type of regional anesthesia. To do the block, a healthcare provider injects numbing medicine into a certain nerve or bundle of nerves. The area below the nerves is then numbed for a time.    Why PNBs are done  PNBs are most often used to prevent pain during surgery and for a time afterward. They can be used for your arms, hands, legs, or feet. They may also be used for your neck, face, or groin. PNBs provide pain relief that lasts longer than local anesthesia. They can also be used to numb smaller areas of the body than other types of regional anesthesia.    How PNBs are done  · An IV (intravenous) line may be put into a vein in your arm or hand. This line provides fluids and  medicines.  · Medical staff closely watches your blood pressure, breathing, and heart rate during the procedure.  · You may be given medicine to help you relax and make you sleepy.  · The healthcare provider identifies the nerves to be numbed. He or she may do this with the help of ultrasound or nerve stimulation. The injection site is chosen.  · The provider inserts a needle with the medicine (anesthetic) at the injection site. He or she injects the medicine.  · The targeted part of your body becomes numb within 10 to 30 minutes. The area stays numb throughout the procedure.  After the procedure is done, the numbness slowly wears off over the next 6 to 30 hours, depending on the type of medicine used.    Risks of PNBs  · Bruising at the injection site  · Nerve injury  · Reaction to the anesthetic  · Injury to the numbed area of the body
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
## 2017-11-06 ENCOUNTER — TELEPHONE (OUTPATIENT)
Dept: ORTHOPEDICS | Facility: CLINIC | Age: 42
End: 2017-11-06

## 2017-11-06 DIAGNOSIS — M25.531 BILATERAL WRIST PAIN: Primary | ICD-10-CM

## 2017-11-06 DIAGNOSIS — M25.532 BILATERAL WRIST PAIN: Primary | ICD-10-CM

## 2017-11-06 DIAGNOSIS — Z98.890 POST-OPERATIVE STATE: ICD-10-CM

## 2017-11-06 NOTE — TELEPHONE ENCOUNTER
Per Andie: sounds like overuse, to start to RICE it, f/u at 2 wk PO appt as scheduled. Pt verbalized understanding. Xray scheduled for before office visit.

## 2017-11-06 NOTE — TELEPHONE ENCOUNTER
----- Message from Malka Escudero sent at 11/6/2017  8:31 AM CST -----  Contact: DANILO GROSS [[id number]]  x_  1st Request  _  2nd Request  _  3rd Request        Who: DANILO GROSS [[id number]]    Why: patient states he just had surgery for his right wrist and now his left wrist is bothering him. Patient would like to know if he can schedule an appt for the left wrist before his post op appt for his right wrist. Please advise.     What Number to Call Back: [phone]    When to Expect a call back: (Before the end of the day)   -- if call after 3:00 call back will be tomorrow.

## 2017-11-07 ENCOUNTER — TELEPHONE (OUTPATIENT)
Dept: ORTHOPEDICS | Facility: CLINIC | Age: 42
End: 2017-11-07

## 2017-11-07 NOTE — TELEPHONE ENCOUNTER
----- Message from Kingsley Ace sent at 11/7/2017 11:12 AM CST -----  Contact: Pete Castelan  X_  1st Request  _  2nd Request  _  3rd Request        Who: Pete Castelan    Why: Patient needs to have medical leave forms filled out for his employment    What Number to Call Back: [phone]    When to Expect a call back: (Within 24 hours)    Please return the call at earliest convenience. Thanks!

## 2017-11-15 ENCOUNTER — OFFICE VISIT (OUTPATIENT)
Dept: ORTHOPEDICS | Facility: CLINIC | Age: 42
End: 2017-11-15
Payer: COMMERCIAL

## 2017-11-15 ENCOUNTER — HOSPITAL ENCOUNTER (OUTPATIENT)
Dept: RADIOLOGY | Facility: OTHER | Age: 42
Discharge: HOME | End: 2017-11-15
Attending: ORTHOPAEDIC SURGERY
Payer: COMMERCIAL

## 2017-11-15 VITALS
WEIGHT: 240 LBS | RESPIRATION RATE: 18 BRPM | HEART RATE: 92 BPM | DIASTOLIC BLOOD PRESSURE: 81 MMHG | SYSTOLIC BLOOD PRESSURE: 119 MMHG | HEIGHT: 67 IN | BODY MASS INDEX: 37.67 KG/M2

## 2017-11-15 DIAGNOSIS — S52.501A CLOSED FRACTURE OF DISTAL END OF RIGHT RADIUS, UNSPECIFIED FRACTURE MORPHOLOGY, INITIAL ENCOUNTER: Primary | ICD-10-CM

## 2017-11-15 DIAGNOSIS — M25.532 BILATERAL WRIST PAIN: ICD-10-CM

## 2017-11-15 DIAGNOSIS — M25.531 BILATERAL WRIST PAIN: ICD-10-CM

## 2017-11-15 DIAGNOSIS — Z98.890 POST-OPERATIVE STATE: ICD-10-CM

## 2017-11-15 DIAGNOSIS — Z47.89 ORTHOPEDIC AFTERCARE: ICD-10-CM

## 2017-11-15 PROCEDURE — 73110 X-RAY EXAM OF WRIST: CPT | Mod: 50,TC

## 2017-11-15 PROCEDURE — 99024 POSTOP FOLLOW-UP VISIT: CPT | Mod: S$GLB,,, | Performed by: PHYSICIAN ASSISTANT

## 2017-11-15 PROCEDURE — 73110 X-RAY EXAM OF WRIST: CPT | Mod: 26,50,, | Performed by: RADIOLOGY

## 2017-11-15 PROCEDURE — 99999 PR PBB SHADOW E&M-EST. PATIENT-LVL IV: CPT | Mod: PBBFAC,,, | Performed by: PHYSICIAN ASSISTANT

## 2017-11-15 NOTE — PROGRESS NOTES
"Mr. Castelan is here today for a post-operative visit.  He is 14 days status post ORIF right distal radius by Dr. Betancourt on 11/1/17. He reports that he has a few sores from the splint.  Pain is mild, 3/10.  He is taking pain medication as needed.  He denies fever, chills, and sweats since the time of the surgery.     Physical exam:    Vitals:    11/15/17 1010   BP: 119/81   Pulse: 92   Resp: 18   Weight: 108.9 kg (240 lb)   Height: 5' 7" (1.702 m)   PainSc:   3   PainLoc: Wrist     Vital signs are stable, patient is afebrile.  Patient is well dressed and well groomed, no acute distress.  Alert and oriented to person, place, and time.  Post op dressing taken down.  Incision is clean, dry and intact.  Edema of the right hand and forearm.  Two sores noted right hand - index and small fingers. There is no erythema or exudate.  There is no sign of any infection. He is NVI. Suture tails removed without difficulty.      Assessment: 14 days status post Open reduction and internal fixation of right 3-part intraarticular distal radius fracture    Plan:  Pete was seen today for post-op evaluation.    Diagnoses and all orders for this visit:    Closed fracture of distal end of right radius, unspecified fracture morphology, initial encounter  -     X-Ray Wrist Complete Right; Future  -     Ambulatory Referral to Physical/Occupational Therapy    Orthopedic aftercare        - PO instruction reviewed and provided to patient  - X-Rays reviewed with patient - no left wrist fracture   - Forearm brace provided  - Vitamin C and elevate right UE  - Follow up in 4 weeks with X-Ray  - OT ordered  - Call with questions or concerns    "

## 2017-11-21 ENCOUNTER — TELEPHONE (OUTPATIENT)
Dept: ORTHOPEDICS | Facility: CLINIC | Age: 42
End: 2017-11-21

## 2017-11-21 NOTE — TELEPHONE ENCOUNTER
----- Message from Corine Acosta sent at 11/21/2017  2:19 PM CST -----  Contact: pt  _  1st Request  _  2nd Request  _  3rd Request        Who: pt    Why: Requesting a call back in regards to following up on order for PT. Please call pt     What Number to Call Back:[phone]    When to Expect a call back: (Within 24 hours)    Please return the call at earliest convenience. Thanks!

## 2017-11-22 ENCOUNTER — TELEPHONE (OUTPATIENT)
Dept: ORTHOPEDICS | Facility: CLINIC | Age: 42
End: 2017-11-22

## 2017-12-05 ENCOUNTER — TELEPHONE (OUTPATIENT)
Dept: ORTHOPEDICS | Facility: CLINIC | Age: 42
End: 2017-12-05

## 2017-12-07 ENCOUNTER — CLINICAL SUPPORT (OUTPATIENT)
Dept: REHABILITATION | Facility: HOSPITAL | Age: 42
End: 2017-12-07
Payer: COMMERCIAL

## 2017-12-07 DIAGNOSIS — R29.898 WEAKNESS OF RIGHT HAND: ICD-10-CM

## 2017-12-07 DIAGNOSIS — M25.631 STIFFNESS OF RIGHT WRIST JOINT: ICD-10-CM

## 2017-12-07 DIAGNOSIS — M25.531 PAIN IN RIGHT WRIST: ICD-10-CM

## 2017-12-07 PROCEDURE — 97165 OT EVAL LOW COMPLEX 30 MIN: CPT | Mod: PN

## 2017-12-07 NOTE — PATIENT INSTRUCTIONS
[facility]  OCCUPATIONAL THERAPY  HOME EXERCISE PROGRAM   [facility]  [phone]                    _

## 2017-12-07 NOTE — PLAN OF CARE
"TIME RECORD    Date:  2017    Start Time:  215pm  Stop Time:  305pm    PROCEDURES:    TIMED  Procedure Min.                     UNTIMED  Procedure Min.   low eval 45   Fluidotherapy 15 (NC due to eval only authorized)     Total Timed Minutes:  0  Total Timed Units:  0  Total Untimed Units:  1  Charges Billed/# of units:  1 (low eval, )    Visit #:  1  FOTO last administered:  Visit 1      Occupational Therapy -Hand / Wrist  Evaluation    Patient: Pete Castelan  MRN: [id number]  Date of Evaluation: 2017  Referring Physician: Dr. Asia Dorsey MD visit: 2017  Primary Diagnosis: ORIF Right disal radius   Treatment Diagnosis:   1. Pain in right wrist     2. Stiffness of right wrist joint     3. Weakness of right hand       DOS:2017  Certification Period:  2017  to 2017  Precautions:  Universal      Referral Orders:   Eval and treat       Date of onset: 5weeks post op  Mechanism of Injury:   Trauma  Past Medical History/Physical Systems Review:   Past Medical History:   Diagnosis Date    Diabetes mellitus     Hyperlipidemia associated with type 2 diabetes mellitus     Hypertension      Medications: Pete Castelan has a current medication list which includes the following prescription(s): acetaminophen, ascorbic acid (vitamin c), atorvastatin, blood sugar diagnostic, dapagliflozin, hydrochlorothiazide, hydrocodone-acetaminophen 5-325mg, lisinopril, metformin, and oxycodone-acetaminophen.  Prior Therapy:  None for this injury  Prior Level of Function: Independent  Hand dominance: Right      Subjective:     Pt reports  It seems to be doing better"     Pain   At rest: 1-2 out of 10  With work/ Activity: 3 out of 10  Sleepin out of 10  Location of Pain : Right wrist        Objective:   Observation  :Swelling, well healing incision, dryness of hand    Sensation: WNL  Scar / Wound: well healing, slightly dense  Edema:      Date 2017   (in cm) L R   Wrist crease " 19.5 21.0   Mid Palm 24.0 25.8   MPs 22.0 22.5            Range of Motion:       Date 12/7/2017 12/7/2017    MARLO WILLIAM *   Supination/Pronation 75/75 60/65   Wrist ext/flex 75/61 47/20   Wrist RD/UD 25/30 14/15   Thumb MP ext/flx 55 55   Thumb IP ext/flx 65 28   Opposition  DPC SF MP crease   MP IF  LF  RF  SF WNL 45  55  75  81                                            Strength: (VINNIE Dynamometer in lbs.), Average 3 trials, Position II:  Deferred until 8 weeks post op        Pinch Strength: (Pinch Gauge in psis), Average 3 trials Deferred until 8 weeks post op            Treatment Included: OT evaluation, Fluidotherapy: To right hand and wrist for 10 min, continuous air, 110 deg, air speed 100 to decrease pain, edema & scar tissue and increased tissue extensibility, and instruction in written HEP including : 2-3 min each of Retrograde massage, deep friction massage to thumb incision only, AROM x 10 each x 4 xday: FA sup/pronation, Wrist ext/flx, RD/UD, spreads, hook, wave, straight fist, composite fist, lifts all digits, thumb IP blocking, pinky slides  Pt. Instructed in use of moist heat for pain modality. Pt. Educated on use of compression gloves for swelling in hand. Pt. To continue wear of wrist orthosis continuous, remove for bathing, hygiene, and exercises only. No gripping or pinching.  Patient demonstrates good understanding of HEP/modality use for pain management.        Assessment:   Mr. Castelan is 5 weeks post op distal radius fracture of right arm. Pt. Demonstrates deficits in the following problem list, that would benefit from skilled occupational therapy intervention to address.   Problem List :       Decreased ROM   Decreased  and pinch strength   Decreased muscle strength   Decreased functional hand use   Increased pain   Edema   Scar Adhesions    Profile and History Assessment of Occupational Performance Level of Clinical Decision Making Complexity Score   Occupational Profile:    Pete Castelan is a 42 y.o. 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 Pete Castelan has difficulty with  feeding and dressing  shopping, housework/household chores and playing saxophone, opening containers, bimanual skills  affecting his/her daily functional abilities. His main goal for therapy is to regain full function of hand for daily tasks as well as work/ leisure task of playing saxophone.     Comorbidities:   Diabetes    Medical and Therapy History Review:   Brief               Performance Deficits    Physical:  Joint Mobility  Muscle Power/Strength  Muscle Endurance  Skin Integrity/Scar Formation  Edema   Strength  Pinch Strength  Gross Motor Coordination  Fine Motor Coordination  Pain    Cognitive:  No Deficits    Psychosocial:    No Deficits     Clinical Decision Making:  low    Assessment Process:  Problem-Focused Assessments    Modification/Need for Assistance:  Not Necessary    Intervention Selection:  Limited Treatment Options    Focus on Therapeutic Outcomes (FOTO) Symptom Scale: Patient score indicates self perception of 52% impairment, limitation or restriction of function upon initial assessment.       Pt has several treatment options including ASTYM, IASTM, cupping, soft tissue mobs, joint mobs, therex, therapeutic activity, education, endurance training, modalities etc.      Discussed goals and Pt in agreement with goals.    Issued HEP at [facility] and pt able to perform all with minimal verbal and tactile cues provided by OT. Pt able to complete all without c/o and demonstrating good technique       low  Based on PMHX, co morbidities , data from assessments and functional level of assistance required with task and clinical presentation directly impacting function.                                                     Long term goals: (by d/c)  1)  Patient to be IND with HEP and modalities for pain management  2)   Increase ROM 5-10 degrees to increase functional hand use for ADLs/work/leisure activities  3)   strength measurements to be within 10% of non involved hand  4)   Pinch strength measurements to be within 10% of non involved hand  5)   Decrease edema .5 mm to increase joint mobility /flexibility for functional hand use.   6)   Patient to score at 30%  or less on FOTO to demonstrate improved perception of functional independence of UE/hand.      Short term Goals: (4weeks)  1)  Patient to be IND with HEP and modalities for pain management  2)  Increase ROM 3-5 degrees to increase functional hand use for ADLs/work/leisure activities  3)  Measure  strength   4)  Measure pinch strength   5)   Decrease edema .2-.3 mm to increase joint mobility /flexibility for functional hand use.  6)   Patient to score at 45%  or less on FOTO to demonstrate improved perception of functional independence of UE/hand.  Plan:   Patient to be treated by Occupational Therapy    2    times per week for   8                   weeks  during the certification period from   12/7/2017     to  02/07/2017   to achieve the established goals.  Treatment to include :  paraffin, fluidotherapy, manual therapy/joint mobilizations,  modalities for pain management, US 3mhz, therapeutic exercises/activities, scar and edema management, as well as any other treatments deemed necessary based on the patient's needs or progress.               Pt. To begin light progressive resistive strengthening no earlier than 6 weeks post surgery.            I certify the need for these services furnished under this plan of treatment and while under my care    Alberta Chapin, POWER, OTR/L, CHT   Occupational Therapist      ____________________________________                         __________________  Physician/Referring Practitioner                                               Date of Signature

## 2017-12-12 ENCOUNTER — HOSPITAL ENCOUNTER (OUTPATIENT)
Dept: RADIOLOGY | Facility: OTHER | Age: 42
Discharge: HOME | End: 2017-12-12
Attending: PHYSICIAN ASSISTANT
Payer: COMMERCIAL

## 2017-12-12 ENCOUNTER — OFFICE VISIT (OUTPATIENT)
Dept: ORTHOPEDICS | Facility: CLINIC | Age: 42
End: 2017-12-12
Payer: COMMERCIAL

## 2017-12-12 VITALS
RESPIRATION RATE: 18 BRPM | DIASTOLIC BLOOD PRESSURE: 84 MMHG | HEIGHT: 67 IN | WEIGHT: 240 LBS | SYSTOLIC BLOOD PRESSURE: 127 MMHG | HEART RATE: 98 BPM | BODY MASS INDEX: 37.67 KG/M2

## 2017-12-12 DIAGNOSIS — S52.501A CLOSED FRACTURE OF DISTAL END OF RIGHT RADIUS, UNSPECIFIED FRACTURE MORPHOLOGY, INITIAL ENCOUNTER: ICD-10-CM

## 2017-12-12 DIAGNOSIS — Z47.89 ORTHOPEDIC AFTERCARE: ICD-10-CM

## 2017-12-12 DIAGNOSIS — S52.501A CLOSED FRACTURE OF DISTAL END OF RIGHT RADIUS, UNSPECIFIED FRACTURE MORPHOLOGY, INITIAL ENCOUNTER: Primary | ICD-10-CM

## 2017-12-12 PROCEDURE — 73110 X-RAY EXAM OF WRIST: CPT | Mod: TC,RT

## 2017-12-12 PROCEDURE — 73110 X-RAY EXAM OF WRIST: CPT | Mod: 26,RT,, | Performed by: RADIOLOGY

## 2017-12-12 PROCEDURE — 99999 PR PBB SHADOW E&M-EST. PATIENT-LVL IV: CPT | Mod: PBBFAC,,, | Performed by: PHYSICIAN ASSISTANT

## 2017-12-12 PROCEDURE — 99024 POSTOP FOLLOW-UP VISIT: CPT | Mod: S$GLB,,, | Performed by: PHYSICIAN ASSISTANT

## 2017-12-12 NOTE — PROGRESS NOTES
"Mr. Castelan is here today for a post-operative visit.  He is 41 days status post ORIF right distal radius by Dr. Betancourt on 11/1/17. He reports that he has a few sores from the splint.  Pain is mild, 2/10.  He has started OT, he is wearing his wrist brace regularly.  He continues to elevate the hand and he is taking Vitamin C.  He denies fever, chills, and sweats since the time of the surgery. He reports that his left wrist continues to be mildly bothersome.    Physical exam:    Vitals:    12/12/17 1520   BP: 127/84   Pulse: 98   Resp: 18   Weight: 108.9 kg (240 lb)   Height: 5' 7" (1.702 m)   PainSc:   2   PainLoc: Hand     Vital signs are stable, patient is afebrile.  Patient is well dressed and well groomed, no acute distress.  Alert and oriented to person, place, and time.  Incision is healing well - clean, dry, and intact.  Edema of the right hand and forearm- improved.  There is no erythema or exudate.  There is no sign of any infection. He is NVI. Improving finger and wrist ROM.     Assessment: 41 days status post Open reduction and internal fixation of right 3-part intraarticular distal radius fracture    Plan:  Pete was seen today for post-op evaluation.    Diagnoses and all orders for this visit:    Closed fracture of distal end of right radius, unspecified fracture morphology, initial encounter  -     X-Ray Wrist Complete Right; Future    Orthopedic aftercare        - X-Rays reviewed with patient   - Forearm brace - begin to wean out  - Continue Vitamin C and elevate right UE  - Follow up in 6 weeks with X-Ray  - Continue OT and HEP  - Call with questions or concerns    "

## 2017-12-14 ENCOUNTER — CLINICAL SUPPORT (OUTPATIENT)
Dept: REHABILITATION | Facility: HOSPITAL | Age: 42
End: 2017-12-14
Payer: COMMERCIAL

## 2017-12-14 DIAGNOSIS — R29.898 WEAKNESS OF RIGHT HAND: ICD-10-CM

## 2017-12-14 DIAGNOSIS — M25.631 STIFFNESS OF RIGHT WRIST JOINT: ICD-10-CM

## 2017-12-14 DIAGNOSIS — M25.531 PAIN IN RIGHT WRIST: ICD-10-CM

## 2017-12-14 PROCEDURE — 97110 THERAPEUTIC EXERCISES: CPT | Mod: PN

## 2017-12-14 PROCEDURE — 97140 MANUAL THERAPY 1/> REGIONS: CPT | Mod: PN

## 2017-12-14 PROCEDURE — 97022 WHIRLPOOL THERAPY: CPT | Mod: PN

## 2017-12-14 NOTE — PROGRESS NOTES
OT Daily Progress Note    TIME RECORD    Date:  12/14/2017    Start Time:  140pm  Stop Time:  240pm    PROCEDURES:  TIMED  Procedure Min.       Manual Therapy 15   Therapeutic Exercise 30           UNTIMED  Procedure Min.   Fluidotherapy 15                Total Timed Minutes:  45  Total Timed Units:  3  Total Untimed Units:  1  Charges Billed/# of units:  4 (FL , Mt, TE x 2)    Visit #:  2 of 12 expires 12/31/2017  FOTO last administered:  Visit 1      Occupational Therapy Progress Note    Patient: Pete Castelan  MRN: [id number]  Date of Evaluation: 12/7/2017  Referring Physician: Dr. Aisa Dorsey MD visit: 12/12/2017  Primary Diagnosis: ORIF Right disal radius   Treatment Diagnosis:   1. Pain in right wrist      2. Stiffness of right wrist joint      3. Weakness of right hand         DOS:11/02/2017  Certification Period:  12/7/2017  to 02/07/2018  Precautions:  Universal      Subjective:     Pt. Saw HAZEL Tovar on 12/12/2017, PA note reports good healing of fx, pt to wean from splint. Pt. Reports he is having numbness in the fingers today. He reports he has ordered a compression glove. He reports he is doing well weaning from the splint.   Pain: 2 out of 10 - not severe, pt reports numbness is more bothersome.    Objective:     Patient seen by OT this session. Treatment  consist of the following:     Modalities:     Fluidotherapy: To right wrist for 15 min, continuous air, 110 deg, air speed 100 to decrease pain, edema & scar tissue and increased tissue extensibility prior to joint mobilizations, PROM, AROM, and therex        MT: Performed manual therapy techniques to right wrist including joint mobilizations, grade I-II  to increase joint mobility, ROM and for pain management. PROM wrist ext/fx, PROM all finger extension/flx. Retrograde massage to decrease edema, and deep tissue massage to scar to decrease adhesions.     TE per Log  AROMFA sup/pronation, Wrist ext/flx, RD/UD, spreads, hook, wave, straight  fist, composite fist, lifts all digits, thumb IP blocking, pinky slides   X 10 each way   Prayer stretch 30 sec    Wrist wheel X 3 min sup/pro  X 3 min flx/ext   Wrist dexterciser X 3 min   isospheres  X 3 min   Wrist AROM with dowel kendall over wedge 3x10 each: ext/flx/RD   Small pom pom in hand manipulation  x 3 containers                       Assessment:     Pt. Is 5 weeks post op distal radius ORIF. He presents today for first visit post evaluation. Pt. Reports compliance with HEP. Pt. Able to demo understanding of HEP this date. Pt. With numbness in the hand today that is new. Likely from swelling over carpal tunnel.  Pt will continue to benefit from skilled OT intervention.   Patient is making good progress toward established goals.   Patient continues to demonstrate limitation  with  ROM, Joint mobility, Stiffness, Decreased fine motor coordination, Decreased gross motor coordination, Decreased functional use, Decreased strength, Continued pain and Continued inflammation       Patient Education/Response:     Pt. To continue HEP as instructed previously    Plans and Goals:     Cont per OT POC   Patient to be treated by Occupational Therapy    2    times per week for   8                   weeks  during the certification period from   12/7/2017     to  02/07/2018   to achieve the established goals.  Treatment to include :  paraffin, fluidotherapy, manual therapy/joint mobilizations,  modalities for pain management, US 3mhz, therapeutic exercises/activities, scar and edema management, as well as any other treatments deemed necessary based on the patient's needs or progress.         Long term goals: (by d/c)  1)  Patient to be IND with HEP and modalities for pain management  2)  Increase ROM 5-10 degrees to increase functional hand use for ADLs/work/leisure activities  3)   strength measurements to be within 10% of non involved hand  4)   Pinch strength measurements to be within 10% of non involved hand  5)    Decrease edema .5 mm to increase joint mobility /flexibility for functional hand use.   6)   Patient to score at 30%  or less on FOTO to demonstrate improved perception of functional independence of UE/hand.        Short term Goals: (4weeks)  1)  Patient to be IND with HEP and modalities for pain management  2)  Increase ROM 3-5 degrees to increase functional hand use for ADLs/work/leisure activities  3)  Measure  strength   4)  Measure pinch strength   5)   Decrease edema .2-.3 mm to increase joint mobility /flexibility for functional hand use.  6)   Patient to score at 45%  or less on FOTO to demonstrate improved perception of functional independence of UE/hand.

## 2017-12-20 ENCOUNTER — CLINICAL SUPPORT (OUTPATIENT)
Dept: REHABILITATION | Facility: HOSPITAL | Age: 42
End: 2017-12-20
Payer: COMMERCIAL

## 2017-12-20 DIAGNOSIS — M25.531 PAIN IN RIGHT WRIST: ICD-10-CM

## 2017-12-20 DIAGNOSIS — M25.631 STIFFNESS OF RIGHT WRIST JOINT: ICD-10-CM

## 2017-12-20 DIAGNOSIS — R29.898 WEAKNESS OF RIGHT HAND: ICD-10-CM

## 2017-12-20 PROCEDURE — 97110 THERAPEUTIC EXERCISES: CPT | Mod: PN

## 2017-12-20 PROCEDURE — 97022 WHIRLPOOL THERAPY: CPT | Mod: PN

## 2017-12-20 PROCEDURE — 97140 MANUAL THERAPY 1/> REGIONS: CPT | Mod: PN

## 2017-12-20 NOTE — PATIENT INSTRUCTIONS
Instructions:   Continue with initial exercises    ADD:   Complete 2 x day  Stop if symptoms begin    MEDIAN NERVE: Mobilization I    Stand with right elbow resting at side, palm up. Use opposite hand to pull hand back, fingers relaxed.    X 1 repetition      MEDIAN NERVE: Mobilization II    Stand with right arm in front of body, palm up. Use opposite hand to pull hand back, fingers relaxed.    X 1 repetition      MEDIAN NERVE: Mobilization III    Stand with right elbow resting at side, palm up. Use opposite hand to pull hand and fingers back.       x1 repetition       MEDIAN NERVE: Mobilization IV    Stand with right arm in front of body, palm up. Use opposite hand to pull hand and fingers back.      X 1 repetition            MEDIAN NERVE: Mobilization V    Stand with right elbow resting at side, palm out, hand and fingers pulled back with opposite hand. Keeping hand and fingers pulled back, straighten elbow.     x 1 repetition         MEDIAN NERVE: Mobilization VI    Stand with right elbow resting at side, palm out, index finger and wrist pulled back with opposite hand. Keeping index finger and wrist pulled back, straighten elbow. Repeat with each finger.     x10 repetitions        ADD:         Hold 30 seconds, repeat 3 times, 4 xday

## 2017-12-20 NOTE — PROGRESS NOTES
OT Daily Progress Note    TIME RECORD    Date:  12/20/2017    Start Time:  8am  Stop Time:  9am    PROCEDURES:  TIMED  Procedure Min.       Manual Therapy 15   Therapeutic Exercise 15   Therapeutic Exercise supervised 15 (NC)           UNTIMED  Procedure Min.   Fluidotherapy 15                Total Timed Minutes:  45  Total Timed Units:  2  Total Untimed Units:  1  Charges Billed/# of units:  3 (FL , Mt, TE x 1)    Visit #:  3 of 12 expires 12/31/2017  FOTO last administered:  Visit 1      Occupational Therapy Progress Note    Patient: Pete Castelan  MRN: [id number]  Date of Evaluation: 12/7/2017  Referring Physician: Dr. Asia Dorsey MD visit: 12/12/2017  Primary Diagnosis: ORIF Right disal radius   Treatment Diagnosis:   1. Pain in right wrist      2. Stiffness of right wrist joint      3. Weakness of right hand         DOS:11/02/2017  Certification Period:  12/7/2017  to 02/07/2018  Precautions:  Universal      Subjective:     Pt reports he has been able to play his instruments with good accuracy.   Pain: 2 out of 10 - not severe, pt reports numbness is more bothersome.    Objective:     Patient seen by OT this session. Treatment  consist of the following:     Modalities:     Fluidotherapy: To right wrist for 15 min, continuous air, 110 deg, air speed 100 to decrease pain, edema & scar tissue and increased tissue extensibility prior to joint mobilizations, PROM, AROM, and therex        MT: Performed manual therapy techniques to right wrist including joint mobilizations, grade I-II  to increase joint mobility, ROM and for pain management. PROM wrist ext/fx, PROM all finger extension/flx. Retrograde massage to decrease edema, and deep tissue massage to scar to decrease adhesions.     TE per Log  AROMFA sup/pronation, Wrist ext/flx, RD/UD, spreads, hook, wave, straight fist, composite fist, lifts all digits, thumb IP blocking, pinky slides   X 10 each way   Prayer stretch 30 sec    Wrist wheel X 3 min  sup/pro  X 3 min flx/ext   Wrist dexterciser X 3 min   isospheres  X 3 min   Wrist PRE  1#x3x10 each: ext/flx/RD   coin in hand manipulation  x 1 container1   Median nerve glides Added to HEP (see instructions) mobilization 1-4 x 1 and mobilization 5 x 10                  Assessment:     Pt. Is 5.5 weeks post op distal radius ORIF.  Pt. Reports compliance with HEP. Pt. Able to demo understanding of HEP this date. Pt. Continues With numbness in the hand today.  Likely from swelling over carpal tunnel.  Pt will continue to benefit from skilled OT intervention.   Patient is making good progress toward established goals.   Patient continues to demonstrate limitation  with  ROM, Joint mobility, Stiffness, Decreased fine motor coordination, Decreased gross motor coordination, Decreased functional use, Decreased strength, Continued pain and Continued inflammation       Patient Education/Response:     Pt. To continue HEP as instructed previously, adding nerve glides 3xday and prayer stretch 30 sec hold x 3-4xdaily. Educated pt on use of isotoner compression glove for hand for swelling. Pt. Verbalized understanding.     Plans and Goals:     Cont per OT POC   Patient to be treated by Occupational Therapy    2    times per week for   8                   weeks  during the certification period from   12/7/2017     to  02/07/2018   to achieve the established goals.  Treatment to include :  paraffin, fluidotherapy, manual therapy/joint mobilizations,  modalities for pain management, US 3mhz, therapeutic exercises/activities, scar and edema management, as well as any other treatments deemed necessary based on the patient's needs or progress.         Long term goals: (by d/c)  1)  Patient to be IND with HEP and modalities for pain management  2)  Increase ROM 5-10 degrees to increase functional hand use for ADLs/work/leisure activities  3)   strength measurements to be within 10% of non involved hand  4)   Pinch strength  measurements to be within 10% of non involved hand  5)   Decrease edema .5 mm to increase joint mobility /flexibility for functional hand use.   6)   Patient to score at 30%  or less on FOTO to demonstrate improved perception of functional independence of UE/hand.        Short term Goals: (4weeks)  1)  Patient to be IND with HEP and modalities for pain management  2)  Increase ROM 3-5 degrees to increase functional hand use for ADLs/work/leisure activities  3)  Measure  strength   4)  Measure pinch strength   5)   Decrease edema .2-.3 mm to increase joint mobility /flexibility for functional hand use.  6)   Patient to score at 45%  or less on FOTO to demonstrate improved perception of functional independence of UE/hand.

## 2017-12-27 ENCOUNTER — CLINICAL SUPPORT (OUTPATIENT)
Dept: REHABILITATION | Facility: HOSPITAL | Age: 42
End: 2017-12-27
Payer: COMMERCIAL

## 2017-12-27 DIAGNOSIS — M25.631 STIFFNESS OF RIGHT WRIST JOINT: ICD-10-CM

## 2017-12-27 DIAGNOSIS — M25.531 PAIN IN RIGHT WRIST: ICD-10-CM

## 2017-12-27 DIAGNOSIS — R29.898 WEAKNESS OF RIGHT HAND: ICD-10-CM

## 2017-12-27 PROCEDURE — 97022 WHIRLPOOL THERAPY: CPT | Mod: PN

## 2017-12-27 PROCEDURE — 97110 THERAPEUTIC EXERCISES: CPT | Mod: PN

## 2017-12-27 PROCEDURE — 97140 MANUAL THERAPY 1/> REGIONS: CPT | Mod: PN

## 2017-12-27 NOTE — PROGRESS NOTES
OT Daily Progress Note    TIME RECORD    Date:  12/27/2017    Start Time:  810am  Stop Time:  905am    PROCEDURES:  TIMED  Procedure Min.       Manual Therapy 15   Therapeutic Exercise 25   Therapeutic Exercise supervised 0           UNTIMED  Procedure Min.   Fluidotherapy 15                Total Timed Minutes:  40  Total Timed Units:  3  Total Untimed Units:  1  Charges Billed/# of units:  4 (FL , Mt, TE x 2)    Visit #:  4 of 12 expires 12/31/2017  FOTO last administered:  Visit 1      Occupational Therapy Progress Note    Patient: Pete Castelan  MRN: [id number]  Date of Evaluation: 12/7/2017  Referring Physician: Dr. Asia Dorsey MD visit: 12/12/2017  Primary Diagnosis: ORIF Right disal radius   Treatment Diagnosis:   1. Pain in right wrist      2. Stiffness of right wrist joint      3. Weakness of right hand         DOS:11/02/2017  Certification Period:  12/7/2017  to 02/07/2018  Precautions:  Universal      Subjective:     Pt reports he tried to find a  Compression glove but it was too small. He plans to order one online.  Pain: 2 out of 10 - not severe, pt reports numbness is more bothersome.    Objective:     Patient seen by OT this session. Treatment  consist of the following:     Modalities:     Fluidotherapy: To right wrist for 15 min, continuous air, 110 deg, air speed 100 to decrease pain, edema & scar tissue and increased tissue extensibility prior to joint mobilizations, PROM, AROM, and therex        MT: Performed manual therapy techniques to right wrist including joint mobilizations, grade I-II  to increase joint mobility, ROM and for pain management. PROM wrist ext/fx, PROM all finger extension/flx. Retrograde massage to decrease edema, and deep tissue massage to scar to decrease adhesions.     TE per Log  AROMFA sup/pronation, Wrist ext/flx, RD/UD, spreads, hook, wave, straight fist, composite fist, lifts all digits, thumb IP blocking, pinky slides   X 10 each way   Prayer stretch 30 sec     Wrist wheel X 3 min sup/pro  X 3 min flx/ext   Wrist dexterciser X 3 min   isospheres  X 3 min   Wrist PRE  2#x3x10 each: ext/flx/RD   coin in hand manipulation  x 1 container1   Median nerve glides Added to HEP (see pt instructions)   Light grippers X 10 squeezes each              Assessment:     Pt. Is 8 weeks post op distal radius ORIF.  Pt. Reports compliance with HEP. Pt. Able to demo understanding of HEP this date. Pt. Continues With numbness in the hand today. He reports it feels relief in a dependent position. Provided additional nerve glides to hand/wrist this date. (see pt instructions) . Added 1# to wrist PRE with good participation. Added light grippers with good participation. Pt will continue to benefit from skilled OT intervention.   Patient is making good progress toward established goals.   Patient continues to demonstrate limitation  with  ROM, Joint mobility, Stiffness, Decreased fine motor coordination, Decreased gross motor coordination, Decreased functional use, Decreased strength, Continued pain and Continued inflammation       Patient Education/Response:     Pt. To continue HEP as instructed previously, nerve glides 3xday and prayer stretch 30 sec hold x 3-4xdaily. Provided additional median nerve glides for hand/wrist only Educated pt on use of isotoner compression glove for hand for swelling. Pt. Verbalized understanding.     Plans and Goals:     Cont per OT POC   Patient to be treated by Occupational Therapy    2    times per week for   8                   weeks  during the certification period from   12/7/2017     to  02/07/2018   to achieve the established goals.  Treatment to include :  paraffin, fluidotherapy, manual therapy/joint mobilizations,  modalities for pain management, US 3mhz, therapeutic exercises/activities, scar and edema management, as well as any other treatments deemed necessary based on the patient's needs or progress.         Long term goals: (by d/c)  1)  Patient  to be IND with HEP and modalities for pain management  2)  Increase ROM 5-10 degrees to increase functional hand use for ADLs/work/leisure activities  3)   strength measurements to be within 10% of non involved hand  4)   Pinch strength measurements to be within 10% of non involved hand  5)   Decrease edema .5 mm to increase joint mobility /flexibility for functional hand use.   6)   Patient to score at 30%  or less on FOTO to demonstrate improved perception of functional independence of UE/hand.        Short term Goals: (4weeks)  1)  Patient to be IND with HEP and modalities for pain management  2)  Increase ROM 3-5 degrees to increase functional hand use for ADLs/work/leisure activities  3)  Measure  strength   4)  Measure pinch strength   5)   Decrease edema .2-.3 mm to increase joint mobility /flexibility for functional hand use.  6)   Patient to score at 45%  or less on FOTO to demonstrate improved perception of functional independence of UE/hand.

## 2017-12-27 NOTE — PATIENT INSTRUCTIONS
[facility] [place]  [phone]  YOHANNES SANFORD, OTDANIELA, OTR/L, CHT  OCCUPATIONAL THERAPIST, CERTIFIED HAND THERAPIST

## 2017-12-29 ENCOUNTER — CLINICAL SUPPORT (OUTPATIENT)
Dept: REHABILITATION | Facility: HOSPITAL | Age: 42
End: 2017-12-29
Payer: COMMERCIAL

## 2017-12-29 DIAGNOSIS — R29.898 WEAKNESS OF RIGHT HAND: ICD-10-CM

## 2017-12-29 DIAGNOSIS — M25.631 STIFFNESS OF RIGHT WRIST JOINT: ICD-10-CM

## 2017-12-29 DIAGNOSIS — M25.531 PAIN IN RIGHT WRIST: ICD-10-CM

## 2017-12-29 PROCEDURE — 97140 MANUAL THERAPY 1/> REGIONS: CPT | Mod: PN

## 2017-12-29 PROCEDURE — 97022 WHIRLPOOL THERAPY: CPT | Mod: PN

## 2017-12-29 PROCEDURE — 97110 THERAPEUTIC EXERCISES: CPT | Mod: PN

## 2017-12-29 NOTE — PROGRESS NOTES
"OT Daily Progress Note    TIME RECORD    Date:  12/29/2017    Start Time:  1205  Stop Time:  105    PROCEDURES:  TIMED  Procedure Min.       Manual Therapy 15   Therapeutic Exercise 30   Therapeutic Exercise supervised 0           UNTIMED  Procedure Min.   Fluidotherapy 15                Total Timed Minutes:  45  Total Timed Units:  3  Total Untimed Units:  1  Charges Billed/# of units:  4 (FL , Mt, TE x 2)    Visit #:  5 of 12 expires 12/31/2017 *FOTO NEXT SESSION*  FOTO last administered:  Visit 1      Occupational Therapy Progress Note    Patient: Pete Castelan  MRN: [id number]  Date of Evaluation: 12/7/2017  Referring Physician: Dr. Asia Dorsey MD visit: 12/12/2017  Primary Diagnosis: ORIF Right disal radius   Treatment Diagnosis:   1. Pain in right wrist      2. Stiffness of right wrist joint      3. Weakness of right hand         DOS:11/02/2017  Certification Period:  12/7/2017  to 02/07/2018  Precautions:  Universal      Subjective:     "I wake up at night because it hurts from numbness."  Pain: 2 out of 10 - not severe, pt reports numbness is more bothersome.    Objective:     Patient seen by OT this session. Treatment  consist of the following:     Modalities:     Fluidotherapy: To right wrist for 15 min, continuous air, 110 deg, air speed 100 to decrease pain, edema & scar tissue and increased tissue extensibility prior to joint mobilizations, PROM, AROM, and therex    MT: Performed manual therapy techniques to right wrist including joint mobilizations, grade I-II  to increase joint mobility, ROM and for pain management. PROM wrist ext/fx, PROM all finger extension/flx. Retrograde massage to decrease edema, and deep tissue massage to scar to decrease adhesions.     TE per Log  AROMFA sup/pronation, Wrist ext/flx, RD/UD, spreads, hook, wave, straight fist, composite fist, lifts all digits, thumb IP blocking, pinky slides   X 10 each way   Prayer stretch 30 sec    Wrist wheel X 3 min sup/pro  X 3 min " flx/ext   Wrist dexterciser X 3 min   isospheres  X 3 min   Wrist PRE  3#x3x10 each: ext/flx/RD   coin in hand manipulation  x 1 container1   Median nerve glides X 5 reps   Light grippers  X 10 squeezes each               Assessment:     Pt. Is ~8 weeks post op distal radius ORIF. He continues to reports numbness and tingling as his biggest issue when performing ADL's and sleeping. He is planning on ordering Isotoner glove to decrease swelling and OT suggested sleeping with brace for proper positioning of wrist in attempts to decrease numbness/tingling. Pt able to complete all PRE's and nerve glides with good technique.  He did require frequent rest breaks to shake arm secondary to numbness at times during activities that required grasping. Pt will continue to benefit from skilled OT intervention.   Patient is making good progress toward established goals.   Patient continues to demonstrate limitation  with  ROM, Joint mobility, Stiffness, Decreased fine motor coordination, Decreased gross motor coordination, Decreased functional use, Decreased strength, Continued pain and Continued inflammation       Patient Education/Response:     Cont HEP.     Plans and Goals:     Cont per OT POC   Patient to be treated by Occupational Therapy    2    times per week for   8                   weeks  during the certification period from   12/7/2017     to  02/07/2018   to achieve the established goals.  Treatment to include :  paraffin, fluidotherapy, manual therapy/joint mobilizations,  modalities for pain management, US 3mhz, therapeutic exercises/activities, scar and edema management, as well as any other treatments deemed necessary based on the patient's needs or progress.         Long term goals: (by d/c)  1)  Patient to be IND with HEP and modalities for pain management  2)  Increase ROM 5-10 degrees to increase functional hand use for ADLs/work/leisure activities  3)   strength measurements to be within 10% of non  involved hand  4)   Pinch strength measurements to be within 10% of non involved hand  5)   Decrease edema .5 mm to increase joint mobility /flexibility for functional hand use.   6)   Patient to score at 30%  or less on FOTO to demonstrate improved perception of functional independence of UE/hand.        Short term Goals: (4weeks)  1)  Patient to be IND with HEP and modalities for pain management  2)  Increase ROM 3-5 degrees to increase functional hand use for ADLs/work/leisure activities  3)  Measure  strength   4)  Measure pinch strength   5)   Decrease edema .2-.3 mm to increase joint mobility /flexibility for functional hand use.  6)   Patient to score at 45%  or less on FOTO to demonstrate improved perception of functional independence of UE/hand.

## 2018-01-03 ENCOUNTER — CLINICAL SUPPORT (OUTPATIENT)
Dept: REHABILITATION | Facility: HOSPITAL | Age: 43
End: 2018-01-03
Payer: COMMERCIAL

## 2018-01-03 DIAGNOSIS — M25.531 PAIN IN RIGHT WRIST: ICD-10-CM

## 2018-01-03 DIAGNOSIS — M25.631 STIFFNESS OF RIGHT WRIST JOINT: ICD-10-CM

## 2018-01-03 DIAGNOSIS — R29.898 WEAKNESS OF RIGHT HAND: ICD-10-CM

## 2018-01-03 PROCEDURE — 97022 WHIRLPOOL THERAPY: CPT | Mod: PN

## 2018-01-03 PROCEDURE — 97110 THERAPEUTIC EXERCISES: CPT | Mod: PN

## 2018-01-03 PROCEDURE — 97140 MANUAL THERAPY 1/> REGIONS: CPT | Mod: PN

## 2018-01-03 NOTE — PROGRESS NOTES
"OT Daily Progress Note    TIME RECORD    Date:  01/03/2018    Start Time:  338pm  Stop Time:  438pm    PROCEDURES:  TIMED  Procedure Min.       Manual Therapy 15   Therapeutic Exercise 30   Therapeutic Exercise supervised 0           UNTIMED  Procedure Min.   Fluidotherapy 15                Total Timed Minutes:  45  Total Timed Units:  3  Total Untimed Units:  1  Charges Billed/# of units:  4 (FL , Mt, TE x 2)    Visit #:  6 of 12 expires 12/31/2017   FOTO last administered:  Visit 1, 6      Occupational Therapy Progress Note    Patient: Pete Castelan  MRN: [id number]  Date of Evaluation: 12/7/2017  Referring Physician: Dr. Asia Dorsey MD visit: 12/12/2017  Primary Diagnosis: ORIF Right disal radius   Treatment Diagnosis:   1. Pain in right wrist      2. Stiffness of right wrist joint      3. Weakness of right hand         DOS:11/02/2017  Certification Period:  12/7/2017  to 02/07/2018  Precautions:  Universal      Subjective:     " The glove helps a lot during the day but the splint isn't making much of a difference at night."  Pain: 2 out of 10 - not severe, pt reports numbness is more bothersome.    Objective:     Patient seen by OT this session. Treatment  consist of the following: Edema:       Date 12/7/2017 1/03/2018   (in cm) L R   Wrist crease 19.5 19.7 (-0.3)   Mid Palm 24.0 24.0   MPs 22.0 22.5             Range of Motion:       Date 12/7/2017 1/3/2018     LAROM RAROM *   Supination/Pronation 75/75 75/80 (+15/+15)   Wrist ext/flex 75/61 50/35 (+3/+15)   Wrist RD/UD 25/30 20/25 (+6/+10)   Thumb MP ext/flx 55 64 (+9)   Thumb IP ext/flx 65 55 (+37)   Opposition  DPC 0.5cm DPC crease   MP IF  PIP  DIP  CENTENO WNL 75  102  65  242         Strength: (in pounds/psi)    Date 01/03/2018    L/R*   Rung II 89/35   Rung III 99/60   Lateral pinch 19/15   Tripod pinch 25/16   Tip pinch 17/14       Focus on Therapeutic Outcomes (FOTO) Symptom Scale: Patient score indicates self perception of 33%  impairment, " limitation or restriction of function upon today's assessment.       Modalities:     Fluidotherapy: To right wrist for 15 min, continuous air, 110 deg, air speed 100 to decrease pain, edema & scar tissue and increased tissue extensibility prior to joint mobilizations, PROM, AROM, and therex    MT: Performed manual therapy techniques to right wrist including joint mobilizations, grade I-II  to increase joint mobility, ROM and for pain management. PROM wrist ext/fx, PROM all finger extension/flx. Retrograde massage to decrease edema, and deep tissue massage to scar to decrease adhesions.     TE per Log  AROMFA sup/pronation, Wrist ext/flx, RD/UD, spreads, hook, wave, straight fist, composite fist, lifts all digits, thumb IP blocking, pinky slides   X 10 each way   Prayer stretch 30 sec    Wrist wheel   X 3 min flx/ext   Wrist dexterciser X 3 min   isospheres  X 3 min   Wrist PRE  3#x3x10 each: ext/flx/RD   coin in hand manipulation  x 1 container   Median nerve glides X 5 reps   Light grippers  X 10 squeezes each               Assessment:     Pt. Is ~8 weeks post op distal radius ORIF. He continues to reports numbness and tingling as his biggest issue when performing ADL's and sleeping. He reports the glove is helpful during the day but the splint at night hasn't made much of a difference. AROM with increases in sup/pro, RD/UD, composite fist,  and flx. Minimal changes in wrist extension.  Baseline  and pinch taken with ~60% deficit from non affected side.  FOTO with 19 % increase in pt's perceived function demonstrating improvement in function use. Pt able to complete all PRE's and nerve glides with good technique.  He continues to require frequent rest breaks to shake arm secondary to numbness at times during activities that required grasping. Pt will continue to benefit from skilled OT intervention.   Patient is making good progress toward established goals.   Patient continues to demonstrate limitation  with   ROM, Joint mobility, Stiffness, Decreased fine motor coordination, Decreased gross motor coordination, Decreased functional use, Decreased strength, Continued pain and Continued inflammation       Patient Education/Response:     Cont HEP.     Plans and Goals:     Cont per OT POC   Patient to be treated by Occupational Therapy    2    times per week for   8                   weeks  during the certification period from   12/7/2017     to  02/07/2018   to achieve the established goals.  Treatment to include :  paraffin, fluidotherapy, manual therapy/joint mobilizations,  modalities for pain management, US 3mhz, therapeutic exercises/activities, scar and edema management, as well as any other treatments deemed necessary based on the patient's needs or progress.         Long term goals: (by d/c)  1)  Patient to be IND with HEP and modalities for pain management  2)  Increase ROM 5-10 degrees to increase functional hand use for ADLs/work/leisure activities  3)   strength measurements to be within 10% of non involved hand  4)   Pinch strength measurements to be within 10% of non involved hand  5)   Decrease edema .5 mm to increase joint mobility /flexibility for functional hand use.   6)   Patient to score at 30%  or less on FOTO to demonstrate improved perception of functional independence of UE/hand.        Short term Goals: (4weeks)  1)  Patient to be IND with HEP and modalities for pain management  2)  Increase ROM 3-5 degrees to increase functional hand use for ADLs/work/leisure activities  3)  Measure  strength   4)  Measure pinch strength   5)   Decrease edema .2-.3 mm to increase joint mobility /flexibility for functional hand use.  6)   Patient to score at 45%  or less on FOTO to demonstrate improved perception of functional independence of UE/hand.

## 2018-01-04 ENCOUNTER — CLINICAL SUPPORT (OUTPATIENT)
Dept: REHABILITATION | Facility: HOSPITAL | Age: 43
End: 2018-01-04
Payer: COMMERCIAL

## 2018-01-04 DIAGNOSIS — R29.898 WEAKNESS OF RIGHT HAND: ICD-10-CM

## 2018-01-04 DIAGNOSIS — M25.631 STIFFNESS OF RIGHT WRIST JOINT: ICD-10-CM

## 2018-01-04 DIAGNOSIS — M25.531 PAIN IN RIGHT WRIST: ICD-10-CM

## 2018-01-04 PROCEDURE — 97140 MANUAL THERAPY 1/> REGIONS: CPT | Mod: PN

## 2018-01-04 PROCEDURE — 97110 THERAPEUTIC EXERCISES: CPT | Mod: PN

## 2018-01-04 PROCEDURE — 97022 WHIRLPOOL THERAPY: CPT | Mod: PN

## 2018-01-04 NOTE — PROGRESS NOTES
"OT Daily Progress Note    TIME RECORD    Date:  01/04/2018    Start Time:  215pm  Stop Time:  308pm    PROCEDURES:  TIMED  Procedure Min.       Manual Therapy 15   Therapeutic Exercise 23   Therapeutic Exercise supervised 0           UNTIMED  Procedure Min.   Fluidotherapy 15                Total Timed Minutes:  38  Total Timed Units:  3  Total Untimed Units:  1  Charges Billed/# of units:  4 (FL , Mt, TE x 2)    Visit #:  2  of 7 expires 2/2/18  Total visit count from eval: 7  FOTO last administered:  Visit 1, 6      Occupational Therapy Progress Note    Patient: Pete Castelan  MRN: [id number]  Date of Evaluation: 12/7/2017  Referring Physician: Dr. Asia Dorsey MD visit: 12/12/2017  Primary Diagnosis: ORIF Right disal radius   Treatment Diagnosis:   1. Pain in right wrist      2. Stiffness of right wrist joint      3. Weakness of right hand         DOS:11/02/2017  Certification Period:  12/7/2017  to 02/07/2018  Precautions:  Universal      Subjective:     " I feel like I can lift everything I need to, its just the numbness"   Pain: 2 out of 10 - not severe, pt reports numbness is more bothersome.    Objective:     Patient seen by OT this session. Treatment  consist of the following:   Modalities:     Fluidotherapy: To right wrist for 15 min, continuous air, 110 deg, air speed 100 to decrease pain, edema & scar tissue and increased tissue extensibility prior to joint mobilizations, PROM, AROM, and therex    MT: Performed manual therapy techniques to right wrist including joint mobilizations, grade I-II  to increase joint mobility, ROM and for pain management. PROM wrist ext/fx, PROM all finger extension/flx. Retrograde massage to decrease edema, and deep tissue massage to scar to decrease adhesions.     TE per Log  AROMFA sup/pronation, Wrist ext/flx, RD/UD, spreads, hook, wave, straight fist, composite fist, lifts all digits, thumb IP blocking, pinky slides   X 10 each way   Prayer stretch 30 sec x 3 "   Wrist wheel   X 3 min flx/ext   Wrist dexterciser X 3 min   isospheres  X 3 min   Wrist PRE  3#x3x10 each: ext/flx/RD   Median nerve glides X 5 reps   Medium grippers  X 10 squeezes each   Digi-flex  3# green, x 20 to each digit               Assessment:     Pt. Is ~8 weeks post op distal radius ORIF. He continues to reports numbness and tingling as his biggest issue when performing ADL's and sleeping.Pt able to complete all PRE's and nerve glides with good technique.  He continues to require frequent rest breaks to shake arm secondary to numbness at times during activities that required grasping. Increased resistance activities with good participation. ROM responding well to MT. Pt will continue to benefit from skilled OT intervention.   Patient is making good progress toward established goals.   Patient continues to demonstrate limitation  with  ROM, Joint mobility, Stiffness, Decreased fine motor coordination, Decreased gross motor coordination, Decreased functional use, Decreased strength, Continued pain and Continued inflammation       Patient Education/Response:     Cont HEP.     Plans and Goals:     Cont per OT POC   Patient to be treated by Occupational Therapy    2    times per week for   8                   weeks  during the certification period from   12/7/2017     to  02/07/2018   to achieve the established goals.  Treatment to include :  paraffin, fluidotherapy, manual therapy/joint mobilizations,  modalities for pain management, US 3mhz, therapeutic exercises/activities, scar and edema management, as well as any other treatments deemed necessary based on the patient's needs or progress.         Long term goals: (by d/c)  1)  Patient to be IND with HEP and modalities for pain management  2)  Increase ROM 5-10 degrees to increase functional hand use for ADLs/work/leisure activities  3)   strength measurements to be within 10% of non involved hand  4)   Pinch strength measurements to be within 10%  of non involved hand  5)   Decrease edema .5 mm to increase joint mobility /flexibility for functional hand use.   6)   Patient to score at 30%  or less on FOTO to demonstrate improved perception of functional independence of UE/hand.        Short term Goals: (4weeks)  1)  Patient to be IND with HEP and modalities for pain management  2)  Increase ROM 3-5 degrees to increase functional hand use for ADLs/work/leisure activities  3)  Measure  strength   4)  Measure pinch strength   5)   Decrease edema .2-.3 mm to increase joint mobility /flexibility for functional hand use.  6)   Patient to score at 45%  or less on FOTO to demonstrate improved perception of functional independence of UE/hand.

## 2018-01-08 ENCOUNTER — CLINICAL SUPPORT (OUTPATIENT)
Dept: REHABILITATION | Facility: HOSPITAL | Age: 43
End: 2018-01-08
Payer: COMMERCIAL

## 2018-01-08 DIAGNOSIS — M25.531 PAIN IN RIGHT WRIST: ICD-10-CM

## 2018-01-08 DIAGNOSIS — R29.898 WEAKNESS OF RIGHT HAND: ICD-10-CM

## 2018-01-08 DIAGNOSIS — M25.631 STIFFNESS OF RIGHT WRIST JOINT: ICD-10-CM

## 2018-01-08 PROCEDURE — 97110 THERAPEUTIC EXERCISES: CPT | Mod: PN

## 2018-01-08 PROCEDURE — 97022 WHIRLPOOL THERAPY: CPT | Mod: PN

## 2018-01-08 PROCEDURE — 97140 MANUAL THERAPY 1/> REGIONS: CPT | Mod: PN

## 2018-01-08 NOTE — PROGRESS NOTES
"OT Daily Progress Note    TIME RECORD    Date:  01/08/2018    Start Time:  4:20 pm  Stop Time:  308pm    PROCEDURES:  TIMED  Procedure Min.       Manual Therapy 15   Therapeutic Exercise 23   Therapeutic Exercise supervised 0           UNTIMED  Procedure Min.   Fluidotherapy 15                Total Timed Minutes:  38  Total Timed Units:  3  Total Untimed Units:  1  Charges Billed/# of units:  4 (FL , Mt, TE x 2)    Visit #:  3  of 7 expires 2/2/18  Total visit count from eval: 7  FOTO last administered:  Visit 1, 6      Occupational Therapy Progress Note    Patient: Pete Castelan  MRN: [id number]  Date of Evaluation: 12/7/2017  Referring Physician: Dr. Asia Dorsey MD visit: 12/12/2017  Primary Diagnosis: ORIF Right disal radius   Treatment Diagnosis:   1. Pain in right wrist      2. Stiffness of right wrist joint      3. Weakness of right hand         DOS:11/02/2017  Certification Period:  12/7/2017  to 02/07/2018  Precautions:  Universal      Subjective:     " Its still falls asleep from time to time "   Pain: 2 out of 10 - Pt observed shaking his hand out from numbness    Objective:     Patient seen by OT this session. Treatment  consist of the following:   Modalities:     Fluidotherapy: To right wrist for 15 min, continuous air, 110 deg, air speed 100 to decrease pain, edema & scar tissue and increased tissue extensibility prior to joint mobilizations, PROM, AROM, and therex    MT: Performed manual therapy techniques to right wrist including joint mobilizations, grade I-II  to increase joint mobility, ROM and for pain management. PROM wrist ext/fx, PROM all finger extension/flx. Retrograde massage to decrease edema, and deep tissue massage to scar to decrease adhesions.     TE per Log  AROMFA sup/pronation, Wrist ext/flx, RD/UD, spreads, hook, wave, straight fist, composite fist, lifts all digits, thumb IP blocking, pinky slides   X 10 each way   Prayer stretch 30 sec x 3   Wrist wheel   X 3 min flx/ext "   Wrist dexterciser X 3 min   isospheres  X 3 min   Wrist PRE  3#x3x10 each: ext/flx/RD   Median nerve glides X 5 reps   Medium grippers  X 10 squeezes each   Digi-flex  3# green, x 20 to each digit               Assessment:     Pt. Is ~8 weeks post op distal radius ORIF. He continues to reports numbness and tingling as his biggest issue when performing ADL's and sleeping.Pt able to complete all PRE's and nerve glides with good technique.  He continues to require frequent rest breaks to shake arm secondary to numbness at times during activities that required grasping. Increased resistance activities with good participation. ROM responding well to MT. Pt will continue to benefit from skilled OT intervention.   Pt was able to perform resistive putty activities with occasional rest break. Pt introduced to pec stretch, nerve glides and upper trap stretch to optimize nerve environment.      Patient Education/Response:     Cont HEP.     Plans and Goals:     Cont per OT POC   Patient to be treated by Occupational Therapy    2    times per week for   8                   weeks  during the certification period from   12/7/2017     to  02/07/2018   to achieve the established goals.  Treatment to include :  paraffin, fluidotherapy, manual therapy/joint mobilizations,  modalities for pain management, US 3mhz, therapeutic exercises/activities, scar and edema management, as well as any other treatments deemed necessary based on the patient's needs or progress.         Long term goals: (by d/c)  1)  Patient to be IND with HEP and modalities for pain management  2)  Increase ROM 5-10 degrees to increase functional hand use for ADLs/work/leisure activities  3)   strength measurements to be within 10% of non involved hand  4)   Pinch strength measurements to be within 10% of non involved hand  5)   Decrease edema .5 mm to increase joint mobility /flexibility for functional hand use.   6)   Patient to score at 30%  or less on FOTO  to demonstrate improved perception of functional independence of UE/hand.        Short term Goals: (4weeks)  1)  Patient to be IND with HEP and modalities for pain management  2)  Increase ROM 3-5 degrees to increase functional hand use for ADLs/work/leisure activities  3)  Measure  strength   4)  Measure pinch strength   5)   Decrease edema .2-.3 mm to increase joint mobility /flexibility for functional hand use.  6)   Patient to score at 45%  or less on FOTO to demonstrate improved perception of functional independence of UE/hand.

## 2018-01-10 ENCOUNTER — CLINICAL SUPPORT (OUTPATIENT)
Dept: REHABILITATION | Facility: HOSPITAL | Age: 43
End: 2018-01-10
Payer: COMMERCIAL

## 2018-01-10 DIAGNOSIS — M25.631 STIFFNESS OF RIGHT WRIST JOINT: ICD-10-CM

## 2018-01-10 DIAGNOSIS — M25.531 PAIN IN RIGHT WRIST: ICD-10-CM

## 2018-01-10 DIAGNOSIS — R29.898 WEAKNESS OF RIGHT HAND: ICD-10-CM

## 2018-01-10 PROCEDURE — 97022 WHIRLPOOL THERAPY: CPT | Mod: PN

## 2018-01-10 PROCEDURE — 97110 THERAPEUTIC EXERCISES: CPT | Mod: PN

## 2018-01-10 PROCEDURE — 97140 MANUAL THERAPY 1/> REGIONS: CPT | Mod: PN

## 2018-01-10 NOTE — PROGRESS NOTES
"OT Daily Progress Note    TIME RECORD    Date:  01/10/2018    Start Time:  4:20 pm  Stop Time:  308pm    PROCEDURES:  TIMED  Procedure Min.       Manual Therapy 15   Therapeutic Exercise 23   Therapeutic Exercise supervised 0           UNTIMED  Procedure Min.   Fluidotherapy 15                Total Timed Minutes:  38  Total Timed Units:  3  Total Untimed Units:  1  Charges Billed/# of units:  4 (FL , Mt, TE x 2)    Visit #:  4  of 7 expires 2/2/18  Total visit count from eval: 9  FOTO last administered:  Visit 1, 6      Occupational Therapy Progress Note    Patient: Pete Castelan  MRN: [id number]  Date of Evaluation: 12/7/2017  Referring Physician: Dr. Asia Dorsey MD visit: 12/12/2017  Primary Diagnosis: ORIF Right disal radius   Treatment Diagnosis:   1. Pain in right wrist      2. Stiffness of right wrist joint      3. Weakness of right hand         DOS:11/02/2017  Certification Period:  12/7/2017  to 02/07/2018  Precautions:  Universal      Subjective:     " I still have to shake it out a lot"   Pain: 2 out of 10 - Pt observed shaking his hand out from numbness    Objective:     Patient seen by OT this session. Treatment  consist of the following:   Modalities:     Fluidotherapy: To right wrist for 15 min, continuous air, 110 deg, air speed 100 to decrease pain, edema & scar tissue and increased tissue extensibility prior to joint mobilizations, PROM, AROM, and therex    MT: Performed manual therapy techniques to right wrist including joint mobilizations, grade I-II  to increase joint mobility, ROM and for pain management. PROM wrist ext/fx, PROM all finger extension/flx. Retrograde massage to decrease edema, and deep tissue massage to scar to decrease adhesions.     TE per Log  AROMFA sup/pronation, Wrist ext/flx, RD/UD, spreads, hook, wave, straight fist, composite fist, lifts all digits, thumb IP blocking, pinky slides   X 10 each way   Prayer stretch 30 sec x 3   Wrist dexterciser X 3 min   isospheres  " X 3 min NT time   Wrist PRE  4#x3x10 each: ext/flx/RD   Median nerve glides X 5 reps   Medium grippers  X 10 squeezes each   Digi-flex  3# green, x 20 to each digit   Green theraputty (added to HEP) X 3 min molding  x3 min squeezing  X 5 pancake and bloom (EDC )  X 3 logs jaw pinch  X 3 logs lateral pinch               Assessment:     Pt. Is ~10 weeks post op distal radius ORIF. He continues to reports numbness and tingling as his biggest issue, however, it is resolving to only at nighttime with occasional episodes during activities. .Pt able to complete all PRE's and nerve glides with good technique. Added 1#  To  Wrist PRE  With  Good participation.  Per pt report he used green theraputty last session. Green theraputty activities completed per log with putty provided for HEP. (see below).   He continues to require frequent rest breaks to shake arm secondary to numbness at times during activities that required grasping. Increased resistance activities with good participation. ROM responding well to MT. Pt will continue to benefit from skilled OT intervention.       Patient Education/Response:     Cont HEP. Added HEP for green threaputty (provided for home use) see pt instructions. To be completed every other day.Pt demo'd understanding.     Plans and Goals:     Cont per OT POC   Patient to be treated by Occupational Therapy    2    times per week for   8                   weeks  during the certification period from   12/7/2017     to  02/07/2018   to achieve the established goals.  Treatment to include :  paraffin, fluidotherapy, manual therapy/joint mobilizations,  modalities for pain management, US 3mhz, therapeutic exercises/activities, scar and edema management, as well as any other treatments deemed necessary based on the patient's needs or progress.         Long term goals: (by d/c)  1)  Patient to be IND with HEP and modalities for pain management  2)  Increase ROM 5-10 degrees to increase functional hand  use for ADLs/work/leisure activities  3)   strength measurements to be within 10% of non involved hand  4)   Pinch strength measurements to be within 10% of non involved hand  5)   Decrease edema .5 mm to increase joint mobility /flexibility for functional hand use.   6)   Patient to score at 30%  or less on FOTO to demonstrate improved perception of functional independence of UE/hand.        Short term Goals: (4weeks)  1)  Patient to be IND with HEP and modalities for pain management  2)  Increase ROM 3-5 degrees to increase functional hand use for ADLs/work/leisure activities  3)  Measure  strength   4)  Measure pinch strength   5)   Decrease edema .2-.3 mm to increase joint mobility /flexibility for functional hand use.  6)   Patient to score at 45%  or less on FOTO to demonstrate improved perception of functional independence of UE/hand.

## 2018-01-10 NOTE — PATIENT INSTRUCTIONS
[facility]  OCCUPATIONAL THERAPY  HOME EXERCISE PROGRAM     Complete the following strengthening program 1x every other /day.        3 min    3 min    5 repetitions      3 logs  _ 3 logs                       HILARIO Burks CHT  Certified Hand Therapist  Occupational Therapist

## 2018-01-15 ENCOUNTER — CLINICAL SUPPORT (OUTPATIENT)
Dept: REHABILITATION | Facility: HOSPITAL | Age: 43
End: 2018-01-15
Payer: COMMERCIAL

## 2018-01-15 DIAGNOSIS — M25.531 PAIN IN RIGHT WRIST: ICD-10-CM

## 2018-01-15 DIAGNOSIS — R29.898 WEAKNESS OF RIGHT HAND: ICD-10-CM

## 2018-01-15 DIAGNOSIS — M25.631 STIFFNESS OF RIGHT WRIST JOINT: ICD-10-CM

## 2018-01-15 PROCEDURE — 97022 WHIRLPOOL THERAPY: CPT | Mod: PN

## 2018-01-15 PROCEDURE — 97140 MANUAL THERAPY 1/> REGIONS: CPT | Mod: PN

## 2018-01-15 PROCEDURE — 97110 THERAPEUTIC EXERCISES: CPT | Mod: PN

## 2018-01-15 NOTE — PROGRESS NOTES
"OT Daily Progress Note    TIME RECORD    Date:  01/15/2018    Start Time:  235 pm  Stop Time:  335pm    PROCEDURES:  TIMED  Procedure Min.       Manual Therapy 15   Therapeutic Exercise 30   Therapeutic Exercise supervised 0           UNTIMED  Procedure Min.   Fluidotherapy 15                Total Timed Minutes:  45  Total Timed Units:  3  Total Untimed Units:  1  Charges Billed/# of units:  4 (FL , Mt, TE x 2)    Visit #:  5 of 7 expires 2/2/18  Total visit count from eval: 10  FOTO last administered:  Visit 1, 6      Occupational Therapy Progress Note    Patient: Pete Castelan  MRN: [id number]  Date of Evaluation: 12/7/2017  Referring Physician: Dr. Asia Dorsey MD visit: 01/24/2018  Primary Diagnosis: ORIF Right disal radius   Treatment Diagnosis:   1. Pain in right wrist      2. Stiffness of right wrist joint      3. Weakness of right hand         DOS:11/02/2017  Certification Period:  12/7/2017  to 02/07/2018  Precautions:  Universal      Subjective:     " The numbness is occasional at night.   Pain: 1 out of 10     Objective:     Patient seen by OT this session. Treatment  consist of the following:   Modalities:     Fluidotherapy: To right wrist for 15 min, continuous air, 110 deg, air speed 100 to decrease pain, edema & scar tissue and increased tissue extensibility prior to joint mobilizations, PROM, AROM, and therex    MT: Performed manual therapy techniques to right wrist including joint mobilizations, grade I-II  to increase joint mobility, ROM and for pain management. PROM wrist ext/fx, PROM all finger extension/flx. Retrograde massage to decrease edema, and deep tissue massage to scar to decrease adhesions.     TE per Log  AROMFA sup/pronation, Wrist ext/flx, RD/UD, spreads, hook, wave, straight fist, composite fist, lifts all digits, thumb IP blocking, pinky slides   X 10 each way   Prayer stretch 30 sec x 3   Wrist dexterciser X 3 min   isospheres  X 3 min    Wrist PRE  4#x3x10 each: ext/flx/RD "   Median nerve glides X 5 reps   Heavy grippers  X 20 squeezes each   Digi-flex  3# green, x 20 to each digit   Green theraputty (added to HEP) X 3 min molding  x3 min squeezing  X 5 pancake and bloom (EDC )  X 3 logs jaw pinch  X 3 logs lateral pinch   Red therabar Frowns and smiles x 2/15 each               Assessment:     Pt. Is ~10.5 weeks post op distal radius ORIF. He continues to reports numbness and tingling as his biggest issue, however, it is resolving to only at nighttime with occasional episodes during activities.   He continues to require frequent rest breaks to shake arm secondary to numbness at times during activities that required grasping however this is decreasing. Reviewed HEP with min assist for proper technique. Cont with resistance from previous session with good participation. Increased digi-flex to 7.0# and added red therabar strengthening with good participation. Pt will continue to benefit from skilled OT intervention.       Patient Education/Response:     Cont HEP.for nerve glides, ROM and  for green threaputty (provided for home use) see pt instructions. To be completed every other day.Pt demo'd understanding.     Plans and Goals:     Cont per OT POC   Patient to be treated by Occupational Therapy    2    times per week for   8                   weeks  during the certification period from   12/7/2017     to  02/07/2018   to achieve the established goals.  Treatment to include :  paraffin, fluidotherapy, manual therapy/joint mobilizations,  modalities for pain management, US 3mhz, therapeutic exercises/activities, scar and edema management, as well as any other treatments deemed necessary based on the patient's needs or progress.         Long term goals: (by d/c)  1)  Patient to be IND with HEP and modalities for pain management  2)  Increase ROM 5-10 degrees to increase functional hand use for ADLs/work/leisure activities  3)   strength measurements to be within 10% of non involved  hand  4)   Pinch strength measurements to be within 10% of non involved hand  5)   Decrease edema .5 mm to increase joint mobility /flexibility for functional hand use.   6)   Patient to score at 30%  or less on FOTO to demonstrate improved perception of functional independence of UE/hand.        Short term Goals: (4weeks)  1)  Patient to be IND with HEP and modalities for pain management  2)  Increase ROM 3-5 degrees to increase functional hand use for ADLs/work/leisure activities  3)  Measure  strength   4)  Measure pinch strength   5)   Decrease edema .2-.3 mm to increase joint mobility /flexibility for functional hand use.  6)   Patient to score at 45%  or less on FOTO to demonstrate improved perception of functional independence of UE/hand.

## 2018-01-17 ENCOUNTER — CLINICAL SUPPORT (OUTPATIENT)
Dept: REHABILITATION | Facility: HOSPITAL | Age: 43
End: 2018-01-17
Payer: COMMERCIAL

## 2018-01-17 DIAGNOSIS — R29.898 WEAKNESS OF RIGHT HAND: ICD-10-CM

## 2018-01-17 DIAGNOSIS — M25.631 STIFFNESS OF RIGHT WRIST JOINT: ICD-10-CM

## 2018-01-17 DIAGNOSIS — M25.531 PAIN IN RIGHT WRIST: ICD-10-CM

## 2018-01-17 PROCEDURE — 97110 THERAPEUTIC EXERCISES: CPT | Mod: PN

## 2018-01-17 PROCEDURE — 97022 WHIRLPOOL THERAPY: CPT | Mod: PN

## 2018-01-17 PROCEDURE — 97140 MANUAL THERAPY 1/> REGIONS: CPT | Mod: PN

## 2018-01-17 NOTE — PROGRESS NOTES
"OT Daily Progress Note    TIME RECORD    Date:  01/17/2018    Start Time:  125 pm  Stop Time:  220pm    PROCEDURES:  TIMED  Procedure Min.       Manual Therapy 15   Therapeutic Exercise 25   Therapeutic Exercise supervised 0           UNTIMED  Procedure Min.   Fluidotherapy 15                Total Timed Minutes:  40  Total Timed Units:  3  Total Untimed Units:  1  Charges Billed/# of units:  4 (FL , Mt, TE x 2)    Visit #:  6 of 7 expires 2/2/18  Total visit count from eval: 11  FOTO last administered:  Visit 1, 6      Occupational Therapy Progress Note    Patient: Pete Castelan  MRN: [id number]  Date of Evaluation: 12/7/2017  Referring Physician: Dr. Asia Dorsey MD visit: 01/24/2018  Primary Diagnosis: ORIF Right disal radius   Treatment Diagnosis:   1. Pain in right wrist      2. Stiffness of right wrist joint      3. Weakness of right hand         DOS:11/02/2017  Certification Period:  12/7/2017  to 02/07/2018  Precautions:  Universal      Subjective:     " The numbness is occasional at night.   Pain: 1 out of 10     Objective:     Patient seen by OT this session. Treatment  consist of the following:     Modalities:     Fluidotherapy: To right wrist for 15 min, continuous air, 110 deg, air speed 100 to decrease pain, edema & scar tissue and increased tissue extensibility prior to joint mobilizations, PROM, AROM, and therex    MT: Performed manual therapy techniques to right wrist including joint mobilizations, grade I-II  to increase joint mobility, ROM and for pain management. PROM wrist ext/fx, PROM all finger extension/flx. Retrograde massage to decrease edema, and deep tissue massage to scar to decrease adhesions.     TE per Log  AROMFA sup/pronation, Wrist ext/flx, RD/UD, spreads, hook, wave, straight fist, composite fist, lifts all digits, thumb IP blocking, pinky slides   X 10 each way   Prayer stretch 30 sec x 3   Wrist dexterciser X 3 min   isospheres  X 3 min    Wrist PRE  4#x3x10 each: " ext/flx/RD   Median nerve glides X 5 reps   Heavy grippers  X 20 squeezes each   Digi-flex  5# green, x 20 to each digit  7# blue x 20 each digit   Green theraputty @HEP X 3 min molding  x3 min squeezing  X 5 pancake and bloom (EDC )  X 3 logs jaw pinch  X 3 logs lateral pinch   Red therabar Frowns and smiles x 2/15 each    pom pom  with clothespin 8# x 1 container each x lateral pinch and 3 jaw pinch  6# x 1 container each x lateral pinch and 3 jaw pinch               Assessment:     Pt. Is ~11 weeks post op distal radius ORIF. He continues to reports numbness and tingling as his biggest issue, however, it is resolving to only at nighttime with occasional episodes during activities.   He continues to require frequent rest breaks to shake arm secondary to numbness at times during activities that required grasping however this is decreasing. Reviewed HEP with min assist for proper technique. Cont with resistance from previous session with good participation. Added clothespin pinching strengthening with good participation. Pt will continue to benefit from skilled OT intervention.       Patient Education/Response:     Cont HEP.for nerve glides, ROM and  for green threaputty (provided for home use) see pt instructions. To be completed every other day.Pt demo'd understanding.     Plans and Goals:     Cont per OT POC - DC visit next visit  Patient to be treated by Occupational Therapy    2    times per week for   8                   weeks  during the certification period from   12/7/2017     to  02/07/2018   to achieve the established goals.  Treatment to include :  paraffin, fluidotherapy, manual therapy/joint mobilizations,  modalities for pain management, US 3mhz, therapeutic exercises/activities, scar and edema management, as well as any other treatments deemed necessary based on the patient's needs or progress.         Long term goals: (by d/c)  1)  Patient to be IND with HEP and modalities for pain  management  2)  Increase ROM 5-10 degrees to increase functional hand use for ADLs/work/leisure activities  3)   strength measurements to be within 10% of non involved hand  4)   Pinch strength measurements to be within 10% of non involved hand  5)   Decrease edema .5 mm to increase joint mobility /flexibility for functional hand use.   6)   Patient to score at 30%  or less on FOTO to demonstrate improved perception of functional independence of UE/hand.        Short term Goals: (4weeks)  1)  Patient to be IND with HEP and modalities for pain management  2)  Increase ROM 3-5 degrees to increase functional hand use for ADLs/work/leisure activities  3)  Measure  strength   4)  Measure pinch strength   5)   Decrease edema .2-.3 mm to increase joint mobility /flexibility for functional hand use.  6)   Patient to score at 45%  or less on FOTO to demonstrate improved perception of functional independence of UE/hand.

## 2018-01-22 ENCOUNTER — CLINICAL SUPPORT (OUTPATIENT)
Dept: REHABILITATION | Facility: HOSPITAL | Age: 43
End: 2018-01-22
Payer: COMMERCIAL

## 2018-01-22 DIAGNOSIS — M25.631 STIFFNESS OF RIGHT WRIST JOINT: ICD-10-CM

## 2018-01-22 DIAGNOSIS — M25.531 PAIN IN RIGHT WRIST: ICD-10-CM

## 2018-01-22 DIAGNOSIS — R29.898 WEAKNESS OF RIGHT HAND: ICD-10-CM

## 2018-01-22 PROCEDURE — 97022 WHIRLPOOL THERAPY: CPT | Mod: PN

## 2018-01-22 PROCEDURE — 97110 THERAPEUTIC EXERCISES: CPT | Mod: PN

## 2018-01-22 PROCEDURE — 97140 MANUAL THERAPY 1/> REGIONS: CPT | Mod: PN

## 2018-01-22 NOTE — PROGRESS NOTES
"OT Daily Progress Note    TIME RECORD    Date:  01/22/2018    Start Time:  3:45 pm  Stop Time:  4: 25 pm    PROCEDURES:  TIMED  Procedure Min.       Manual Therapy 15   Therapeutic Exercise 25   Therapeutic Exercise supervised 0           UNTIMED  Procedure Min.   Fluidotherapy 15                Total Timed Minutes:  40  Total Timed Units:  3  Total Untimed Units:  1  Charges Billed/# of units:  4 (FL , Mt, TE x 2)    Visit #:  6 of 7 expires 2/2/18  Total visit count from eval: 11  FOTO last administered:  Visit 1, 6      Occupational Therapy Progress Note    Patient: Pete Castelan  MRN: [id number]  Date of Evaluation: 12/7/2017  Referring Physician: Dr. Asia Dorsey MD visit: 01/24/2018  Primary Diagnosis: ORIF Right disal radius   Treatment Diagnosis:   1. Pain in right wrist      2. Stiffness of right wrist joint      3. Weakness of right hand         DOS:11/02/2017  Certification Period:  12/7/2017  to 02/07/2018  Precautions:  Universal      Subjective:     "it still bothers me at night".   Pain: 1 out of 10     Objective:     Patient seen by OT this session. Treatment  consist of the following:     Modalities:     Fluidotherapy: To right wrist for 15 min, continuous air, 110 deg, air speed 100 to decrease pain, edema & scar tissue and increased tissue extensibility prior to joint mobilizations, PROM, AROM, and therex    MT: Performed manual therapy techniques to right wrist including joint mobilizations, grade I-II  to increase joint mobility, ROM and for pain management. PROM wrist ext/fx, PROM all finger extension/flx. Retrograde massage to decrease edema, and deep tissue massage to scar to decrease adhesions.     TE per Log  AROMFA sup/pronation, Wrist ext/flx, RD/UD, spreads, hook, wave, straight fist, composite fist, lifts all digits, thumb IP blocking, pinky slides   X 10 each way   Prayer stretch 30 sec x 3   Wrist dexterciser X 3 min   isospheres  X 3 min    Wrist PRE  4#x3x10 each: ext/flx/RD "   Median nerve glides X 5 reps   Heavy grippers  X 20 squeezes each   Digi-flex  5# green, x 20 to each digit  7# blue x 20 each digit   Green theraputty @HEP X 3 min molding  x3 min squeezing  X 5 pancake and bloom (EDC )  X 3 logs jaw pinch  X 3 logs lateral pinch   Red therabar Frowns and smiles x 2/15 each    pom pom  with clothespin 8# x 1 container each x lateral pinch and 3 jaw pinch  6# x 1 container each x lateral pinch and 3 jaw pinch               Assessment:     Pt reports nerve glides continue to be beneficial during therapy. Pt able to tone. Increased amount of resistive therex and reports. Noctornal symptoms remain most notable. Pt discussed positioning with sleep and states he does not enter provocative positions mentioned. Pt appears to be able to demonstrate independent strengthening and stretching and will cont. To address pain indepndently.  Pt DC at this time.      Patient Education/Response:     Cont HEP.for nerve glides, ROM and  for green threaputty (provided for home use) see pt instructions. To be completed every other day.Pt demo'd understanding.     Plans and Goals:   Pt Dc.         Long term goals: (by d/c)  1)  Patient to be IND with HEP and modalities for pain management  2)  Increase ROM 5-10 degrees to increase functional hand use for ADLs/work/leisure activities  3)   strength measurements to be within 10% of non involved hand  4)   Pinch strength measurements to be within 10% of non involved hand  5)   Decrease edema .5 mm to increase joint mobility /flexibility for functional hand use.   6)   Patient to score at 30%  or less on FOTO to demonstrate improved perception of functional independence of UE/hand.        Short term Goals: (4weeks)  1)  Patient to be IND with HEP and modalities for pain management  2)  Increase ROM 3-5 degrees to increase functional hand use for ADLs/work/leisure activities  3)  Measure  strength   4)  Measure pinch strength   5)   Decrease  edema .2-.3 mm to increase joint mobility /flexibility for functional hand use.  6)   Patient to score at 45%  or less on FOTO to demonstrate improved perception of functional independence of UE/hand.

## 2018-01-25 ENCOUNTER — HOSPITAL ENCOUNTER (OUTPATIENT)
Dept: RADIOLOGY | Facility: OTHER | Age: 43
Discharge: HOME | End: 2018-01-25
Attending: PHYSICIAN ASSISTANT
Payer: COMMERCIAL

## 2018-01-25 ENCOUNTER — OFFICE VISIT (OUTPATIENT)
Dept: ORTHOPEDICS | Facility: CLINIC | Age: 43
End: 2018-01-25
Payer: COMMERCIAL

## 2018-01-25 VITALS
DIASTOLIC BLOOD PRESSURE: 83 MMHG | RESPIRATION RATE: 18 BRPM | WEIGHT: 240 LBS | BODY MASS INDEX: 37.67 KG/M2 | HEIGHT: 67 IN | SYSTOLIC BLOOD PRESSURE: 122 MMHG | HEART RATE: 98 BPM

## 2018-01-25 DIAGNOSIS — S52.501A CLOSED FRACTURE OF DISTAL END OF RIGHT RADIUS, UNSPECIFIED FRACTURE MORPHOLOGY, INITIAL ENCOUNTER: ICD-10-CM

## 2018-01-25 DIAGNOSIS — S52.501A CLOSED FRACTURE OF DISTAL END OF RIGHT RADIUS, UNSPECIFIED FRACTURE MORPHOLOGY, INITIAL ENCOUNTER: Primary | ICD-10-CM

## 2018-01-25 DIAGNOSIS — R20.0 FINGER NUMBNESS: ICD-10-CM

## 2018-01-25 DIAGNOSIS — Z47.89 ORTHOPEDIC AFTERCARE: ICD-10-CM

## 2018-01-25 PROCEDURE — 99024 POSTOP FOLLOW-UP VISIT: CPT | Mod: S$GLB,,, | Performed by: PHYSICIAN ASSISTANT

## 2018-01-25 PROCEDURE — 73110 X-RAY EXAM OF WRIST: CPT | Mod: 26,RT,, | Performed by: RADIOLOGY

## 2018-01-25 PROCEDURE — 73110 X-RAY EXAM OF WRIST: CPT | Mod: TC,FY,RT

## 2018-01-25 PROCEDURE — 99999 PR PBB SHADOW E&M-EST. PATIENT-LVL III: CPT | Mod: PBBFAC,,, | Performed by: PHYSICIAN ASSISTANT

## 2018-01-25 NOTE — PROGRESS NOTES
"Mr. Castelan is here today for a post-operative visit.  He is 85 days status post ORIF right distal radius by Dr. Betancourt on 11/1/17.  Pain is mild, 2/10.  He has completed OT and is performing HEP.  He reports some tingling at night in the right index and thumb.  He denies fever, chills, and sweats since the time of the surgery.     Physical exam:    Vitals:    01/25/18 1446   BP: 122/83   Pulse: 98   Resp: 18   Weight: 108.9 kg (240 lb)   Height: 5' 7" (1.702 m)   PainSc:   2   PainLoc: Wrist     Vital signs are stable, patient is afebrile.  Patient is well dressed and well groomed, no acute distress.  Alert and oriented to person, place, and time.  Incision is healing well - clean, dry, and intact, mild thickening noted.  Edema of the right hand and forearm- improved.  There is no erythema or exudate.  There is no sign of any infection. He is NVI. Improving finger and wrist ROM. Positive Tinel's bilaterally, Positive Dennis's on the right.    Assessment: 85 days status post Open reduction and internal fixation of right 3-part intraarticular distal radius fracture    Plan:  Pete was seen today for post-op evaluation.    Diagnoses and all orders for this visit:    Closed fracture of distal end of right radius, unspecified fracture morphology, initial encounter    Finger numbness    Orthopedic aftercare        - Carpal tunnel brace  - Continue Vitamin C   - Follow up in 6-8 weeks  - Continue HEP  - Call with questions or concerns    "

## 2018-03-08 ENCOUNTER — OFFICE VISIT (OUTPATIENT)
Dept: ORTHOPEDICS | Facility: CLINIC | Age: 43
End: 2018-03-08
Payer: COMMERCIAL

## 2018-03-08 VITALS
DIASTOLIC BLOOD PRESSURE: 84 MMHG | WEIGHT: 240 LBS | BODY MASS INDEX: 37.67 KG/M2 | SYSTOLIC BLOOD PRESSURE: 129 MMHG | HEIGHT: 67 IN | HEART RATE: 96 BPM | RESPIRATION RATE: 18 BRPM

## 2018-03-08 DIAGNOSIS — R20.0 FINGER NUMBNESS: ICD-10-CM

## 2018-03-08 DIAGNOSIS — S52.501A CLOSED FRACTURE OF DISTAL END OF RIGHT RADIUS, UNSPECIFIED FRACTURE MORPHOLOGY, INITIAL ENCOUNTER: Primary | ICD-10-CM

## 2018-03-08 PROCEDURE — 99213 OFFICE O/P EST LOW 20 MIN: CPT | Mod: S$GLB,,, | Performed by: PHYSICIAN ASSISTANT

## 2018-03-08 PROCEDURE — 3079F DIAST BP 80-89 MM HG: CPT | Mod: S$GLB,,, | Performed by: PHYSICIAN ASSISTANT

## 2018-03-08 PROCEDURE — 3074F SYST BP LT 130 MM HG: CPT | Mod: S$GLB,,, | Performed by: PHYSICIAN ASSISTANT

## 2018-03-08 PROCEDURE — 99999 PR PBB SHADOW E&M-EST. PATIENT-LVL III: CPT | Mod: PBBFAC,,, | Performed by: PHYSICIAN ASSISTANT

## 2018-03-08 NOTE — PROGRESS NOTES
"Mr. Castelan is here today for a post-operative visit.  He is 4.5 months status post ORIF right distal radius by Dr. Betancourt on 11/1/17.  Pain is mild, 2/10.  He has completed OT and is performing HEP.  He reports some tingling at night in the right index and thumb.  He wears a compression glove on occasion, with some relief.  He has attempted to wear a carpal tunnel brace, but has not noticed any significant change.  He denies fever, chills, and sweats since the time of the surgery.     ROS:  Constitutional: no fever or chills  Skin: no rash or suspicious lesions  Musculoskeletal: See HPI.   Neurological: no headaches, lightheadedness, or dizziness.   Psychological/behavioral: no anxiety or depression        Physical exam:    Vitals:    03/08/18 1522   BP: 129/84   Pulse: 96   Resp: 18   Weight: 108.9 kg (240 lb)   Height: 5' 7" (1.702 m)   PainSc:   2   PainLoc: Wrist     Vital signs are stable, patient is afebrile.  Patient is well dressed and well groomed, no acute distress.  Alert and oriented to person, place, and time.  Incision is healing well - clean, dry, and intact, mild thickening noted.  Edema of the right hand and forearm much improved.  There is no erythema or exudate.  There is no sign of any infection. He is NVI. Good finger and wrist ROM. Positive Tinel's, Positive Dennis's on the right.    Assessment: 4.5 months status post Open reduction and internal fixation of right 3-part intraarticular distal radius fracture    Plan:  Pete was seen today for post-op evaluation.    Diagnoses and all orders for this visit:    Closed fracture of distal end of right radius, unspecified fracture morphology, initial encounter  -     EMG W/ ULTRASOUND AND NERVE CONDUCTION TEST 1 Extremity; Future    Finger numbness  -     EMG W/ ULTRASOUND AND NERVE CONDUCTION TEST 1 Extremity; Future      - Discussed possibility of carpal tunnel injection for his carpal tunnel symptoms.  Discussed risk of elevated blood " glucose with any steroid injection.  He states that his blood glucose was high today and he would like to think about carpal tunnel injection, will return if he decides to proceed.  - EMG ordered and scheduled  - Carpal tunnel brace as needed for night  - Continue Vitamin C   - Follow up in 2-4 weeks   - Continue HEP  - Call with questions or concerns

## 2018-03-26 ENCOUNTER — OFFICE VISIT (OUTPATIENT)
Dept: ORTHOPEDICS | Facility: CLINIC | Age: 43
End: 2018-03-26
Payer: COMMERCIAL

## 2018-03-26 VITALS
HEART RATE: 95 BPM | SYSTOLIC BLOOD PRESSURE: 117 MMHG | WEIGHT: 240 LBS | HEIGHT: 67 IN | DIASTOLIC BLOOD PRESSURE: 81 MMHG | BODY MASS INDEX: 37.67 KG/M2 | RESPIRATION RATE: 18 BRPM

## 2018-03-26 DIAGNOSIS — R20.0 BILATERAL FINGER NUMBNESS: Primary | ICD-10-CM

## 2018-03-26 PROCEDURE — 20526 THER INJECTION CARP TUNNEL: CPT | Mod: RT,S$GLB,, | Performed by: PHYSICIAN ASSISTANT

## 2018-03-26 PROCEDURE — 99213 OFFICE O/P EST LOW 20 MIN: CPT | Mod: 25,S$GLB,, | Performed by: PHYSICIAN ASSISTANT

## 2018-03-26 PROCEDURE — 99999 PR PBB SHADOW E&M-EST. PATIENT-LVL III: CPT | Mod: PBBFAC,,, | Performed by: PHYSICIAN ASSISTANT

## 2018-03-26 PROCEDURE — 3079F DIAST BP 80-89 MM HG: CPT | Mod: CPTII,S$GLB,, | Performed by: PHYSICIAN ASSISTANT

## 2018-03-26 PROCEDURE — 3074F SYST BP LT 130 MM HG: CPT | Mod: CPTII,S$GLB,, | Performed by: PHYSICIAN ASSISTANT

## 2018-03-26 RX ORDER — DEXAMETHASONE SODIUM PHOSPHATE 4 MG/ML
4 INJECTION, SOLUTION INTRA-ARTICULAR; INTRALESIONAL; INTRAMUSCULAR; INTRAVENOUS; SOFT TISSUE
Status: COMPLETED | OUTPATIENT
Start: 2018-03-26 | End: 2018-03-26

## 2018-03-26 RX ORDER — LIDOCAINE HYDROCHLORIDE 10 MG/ML
1 INJECTION, SOLUTION EPIDURAL; INFILTRATION; INTRACAUDAL; PERINEURAL
Status: COMPLETED | OUTPATIENT
Start: 2018-03-26 | End: 2018-03-26

## 2018-03-26 RX ADMIN — DEXAMETHASONE SODIUM PHOSPHATE 4 MG: 4 INJECTION, SOLUTION INTRA-ARTICULAR; INTRALESIONAL; INTRAMUSCULAR; INTRAVENOUS; SOFT TISSUE at 04:03

## 2018-03-26 RX ADMIN — LIDOCAINE HYDROCHLORIDE 10 MG: 10 INJECTION, SOLUTION EPIDURAL; INFILTRATION; INTRACAUDAL; PERINEURAL at 04:03

## 2018-03-26 NOTE — PROGRESS NOTES
"Mr. Castelan is here today for a post-operative visit.  He is 5 months status post ORIF right distal radius by Dr. Betancourt on 11/1/17.  Pain is mild, 3/10.  He has completed OT and is performing HEP.  He reports some continued tingling at night in the right index and thumb.  He wears a compression glove on occasion, with some relief.  He has attempted to wear a carpal tunnel brace, but has not noticed any significant change.  He denies fever, chills, and sweats since the time of the surgery.     ROS:  Constitutional: no fever or chills  Skin: no rash or suspicious lesions  Musculoskeletal: See HPI.   Neurological: no headaches, lightheadedness, or dizziness.   Psychological/behavioral: no anxiety or depression        Physical exam:    Vitals:    03/26/18 1531   BP: 117/81   Pulse: 95   Resp: 18   Weight: 108.9 kg (240 lb)   Height: 5' 7" (1.702 m)   PainSc:   3   PainLoc: Wrist     Vital signs are stable, patient is afebrile.  Patient is well dressed and well groomed, no acute distress.  Alert and oriented to person, place, and time.  Incision is well healed, mild thickening noted.  Edema of the right hand and forearm much improved.  There is no erythema or exudate.  There is no sign of any infection. He is NVI. Good finger and wrist ROM. Positive Tinel's bilaterally, Negative Dennis's bilaterally today.    Assessment: 5 months status post Open reduction and internal fixation of right 3-part intraarticular distal radius fracture    Plan:  Pete was seen today for post-op evaluation.    Diagnoses and all orders for this visit:    Bilateral finger numbness    Other orders  -     dexamethasone injection 4 mg; 1 mL (4 mg total) by Other route one time.  -     lidocaine (PF) 10 mg/ml (1%) injection 10 mg; 1 mL (10 mg total) by Other route one time.      - Discussed possibility of carpal tunnel injection for his carpal tunnel symptoms.  He is interested in carpal tunnel injection today, his blood glucose is better " controlled at this time.  - EMG as scheduled  - Carpal tunnel brace as needed for night  - Follow up as scheduled  - Continue HEP  - Call with questions or concerns    PROCEDURE:  I have explained the risks, benefits, and alternatives of the procedure in detail.  The patient voices understanding and all questions have been answered.  The patient agrees to proceed as planned. So after I performed a sterile prep of the skin in the normal fashion the right carpal tunnel is injected from the volar approach using a 25 gauge needle with a combination of 1cc 1% plain xylocaine and 4 mg of dexamethasone.  The patient is cautioned and immediate relief of pain is secondary to the local anesthetic and will be temporary.  After the anesthetic wears off there may be a increase in pain that may last for a few hours or a few days and they should use ice to help alleviate this flair up of pain. Patient tolerated the procedure well.

## 2018-04-22 DIAGNOSIS — E11.9 TYPE 2 DIABETES MELLITUS WITHOUT COMPLICATION, WITHOUT LONG-TERM CURRENT USE OF INSULIN: ICD-10-CM

## 2018-05-01 ENCOUNTER — PROCEDURE VISIT (OUTPATIENT)
Dept: NEUROLOGY | Facility: CLINIC | Age: 43
End: 2018-05-01
Payer: COMMERCIAL

## 2018-05-01 DIAGNOSIS — R20.0 BILATERAL FINGER NUMBNESS: ICD-10-CM

## 2018-05-01 PROCEDURE — 95913 NRV CNDJ TEST 13/> STUDIES: CPT | Mod: S$GLB,,, | Performed by: PSYCHIATRY & NEUROLOGY

## 2018-05-01 PROCEDURE — 95886 MUSC TEST DONE W/N TEST COMP: CPT | Mod: S$GLB,,, | Performed by: PSYCHIATRY & NEUROLOGY

## 2018-05-08 ENCOUNTER — OFFICE VISIT (OUTPATIENT)
Dept: ORTHOPEDICS | Facility: CLINIC | Age: 43
End: 2018-05-08
Payer: COMMERCIAL

## 2018-05-08 VITALS
RESPIRATION RATE: 18 BRPM | HEIGHT: 67 IN | BODY MASS INDEX: 37.67 KG/M2 | SYSTOLIC BLOOD PRESSURE: 114 MMHG | DIASTOLIC BLOOD PRESSURE: 76 MMHG | WEIGHT: 240 LBS | HEART RATE: 108 BPM

## 2018-05-08 DIAGNOSIS — G56.03 BILATERAL CARPAL TUNNEL SYNDROME: Primary | ICD-10-CM

## 2018-05-08 DIAGNOSIS — G56.20 ULNAR NERVE ENTRAPMENT, UNSPECIFIED LATERALITY: ICD-10-CM

## 2018-05-08 PROCEDURE — 3074F SYST BP LT 130 MM HG: CPT | Mod: CPTII,S$GLB,, | Performed by: PHYSICIAN ASSISTANT

## 2018-05-08 PROCEDURE — 3008F BODY MASS INDEX DOCD: CPT | Mod: CPTII,S$GLB,, | Performed by: PHYSICIAN ASSISTANT

## 2018-05-08 PROCEDURE — 99999 PR PBB SHADOW E&M-EST. PATIENT-LVL III: CPT | Mod: PBBFAC,,, | Performed by: PHYSICIAN ASSISTANT

## 2018-05-08 PROCEDURE — 99213 OFFICE O/P EST LOW 20 MIN: CPT | Mod: S$GLB,,, | Performed by: PHYSICIAN ASSISTANT

## 2018-05-08 PROCEDURE — 3078F DIAST BP <80 MM HG: CPT | Mod: CPTII,S$GLB,, | Performed by: PHYSICIAN ASSISTANT

## 2018-05-08 NOTE — PROGRESS NOTES
Subjective:      Patient ID: Pete Castelan III is a 43 y.o. male.    Chief Complaint: Pain of the Right Wrist      HPI  Pete Castelan III is a  43 y.o. male presenting today for follow up of finger numbness and tingling as well as recent EMG.  He reports that he has noticed fewer symptoms of numbness and tingling since his last visit.  At his last visit he underwent right carpal tunnel injection.  He has also been using the carpal tunnel braces regularly.  He reports some nights where his symptoms do not wake him up at all.  He does report that at the end of a long drive he will notice some numbness and tingling in the fingers.  He reports that his pain is improved.       Review of patient's allergies indicates:   Allergen Reactions    Penicillins      Was told by his Mother that is allergic          Current Outpatient Prescriptions   Medication Sig Dispense Refill    acetaminophen (TYLENOL) 500 MG tablet Take 1,000 mg by mouth every 6 (six) hours as needed for Pain.      ascorbic acid, vitamin C, (VITAMIN C) 500 MG tablet Take 500 mg by mouth every morning.      atorvastatin (LIPITOR) 40 MG tablet Take 1 tablet (40 mg total) by mouth once daily. (Patient taking differently: Take 40 mg by mouth nightly. ) 90 tablet 3    dapagliflozin (FARXIGA) 5 mg Tab tablet Take 2 tablets (10 mg total) by mouth once daily. (Patient taking differently: Take 5 mg by mouth every morning. ) 180 tablet 3    hydrochlorothiazide (HYDRODIURIL) 25 MG tablet Take 1 tablet (25 mg total) by mouth once daily. (Patient taking differently: Take 25 mg by mouth nightly. ) 90 tablet 3    hydrocodone-acetaminophen 5-325mg (NORCO) 5-325 mg per tablet Take 1 tablet by mouth every 4 (four) hours as needed for Pain. 18 tablet 0    lisinopril (PRINIVIL,ZESTRIL) 40 MG tablet Take 1 tablet (40 mg total) by mouth once daily. (Patient taking differently: Take 40 mg by mouth every morning. ) 90 tablet 3    metformin (GLUCOPHAGE)  "1000 MG tablet Take 1 tablet (1,000 mg total) by mouth 2 (two) times daily with meals. 180 tablet 3    ONETOUCH ULTRA BLUE TEST STRIP Strp TEST ONCE A  strip 0    oxyCODONE-acetaminophen (PERCOCET) 5-325 mg per tablet Take 1 tablet by mouth every 4 (four) hours as needed for Pain. 45 tablet 0     No current facility-administered medications for this visit.        Past Medical History:   Diagnosis Date    Diabetes mellitus     Hyperlipidemia associated with type 2 diabetes mellitus     Hypertension        Past Surgical History:   Procedure Laterality Date    WRIST FRACTURE SURGERY Right 11/2017    ORIF, radius         Review of Systems:  Review of Systems   Constitution: Negative for chills and fever.   Skin: Negative for rash and suspicious lesions.   Musculoskeletal:        See HPI   Neurological: Negative for dizziness, headaches, light-headedness, numbness and paresthesias.   Psychiatric/Behavioral: Negative for depression. The patient is not nervous/anxious.          OBJECTIVE:     PHYSICAL EXAM:  Height: 5' 7" (170.2 cm) Weight: 108.9 kg (240 lb)     Vitals:    05/08/18 1601   BP: 114/76   Pulse: 108   Resp: 18     General    Vitals reviewed.  Constitutional: He is oriented to person, place, and time. He appears well-developed and well-nourished.   HENT:   Head: Normocephalic and atraumatic.   Neck: Normal range of motion.   Cardiovascular: Normal rate.    Pulmonary/Chest: Effort normal. No respiratory distress.   Neurological: He is alert and oriented to person, place, and time.   Psychiatric: He has a normal mood and affect. His behavior is normal. Judgment and thought content normal.             Musculoskeletal: Well-healed surgical scar volar right wrist.  No significant edema noted bilaterally.  Nontender to palpation.  Good finger wrist and elbow range of motion.  On the right Tinel's at the carpal tunnel and Guyon's canal both positive, negative Tinel's over the cubital tunnel.  On the left " Tinel's at the cubital tunnel and over Guyon's canal both positive, negative Tinel's at the carpal tunnel.  Negative Dennis's bilaterally.  Negative ulnar nerve compression test bilaterally.  Neurovascularly intact-good sensation and motor function, good capillary refill, 2+ radial pulses.    EM2018  Impression   This study is abnormal. There is electrophysiologic evidence for:   1. Chronic, bilateral median neuropathies at the wrist (carpal tunnel syndrome); and   2. Chronic, bilateral, ulnar neuropathies, worse on the left.     ASSESSMENT/PLAN:   Pete was seen today for pain.    Diagnoses and all orders for this visit:    Bilateral carpal tunnel syndrome    Ulnar nerve entrapment, unspecified laterality        - Discussed carpal tunnel syndrome and ulnar nerve compression, discussed treatment options  - Reviewed EMG results at the patient  - Continue regular bracing at night and for extended car trips  - Nerve glides handout provided  - Follow up in 6 weeks

## 2018-06-21 ENCOUNTER — OFFICE VISIT (OUTPATIENT)
Dept: FAMILY MEDICINE | Facility: HOSPITAL | Age: 43
End: 2018-06-21
Payer: COMMERCIAL

## 2018-06-21 VITALS
DIASTOLIC BLOOD PRESSURE: 84 MMHG | BODY MASS INDEX: 38.79 KG/M2 | HEART RATE: 108 BPM | WEIGHT: 247.13 LBS | HEIGHT: 67 IN | SYSTOLIC BLOOD PRESSURE: 130 MMHG

## 2018-06-21 DIAGNOSIS — I10 ESSENTIAL HYPERTENSION: ICD-10-CM

## 2018-06-21 DIAGNOSIS — E11.9 TYPE 2 DIABETES MELLITUS WITHOUT COMPLICATION, WITHOUT LONG-TERM CURRENT USE OF INSULIN: ICD-10-CM

## 2018-06-21 DIAGNOSIS — E78.5 HYPERLIPIDEMIA, UNSPECIFIED HYPERLIPIDEMIA TYPE: ICD-10-CM

## 2018-06-21 PROCEDURE — 99213 OFFICE O/P EST LOW 20 MIN: CPT | Performed by: FAMILY MEDICINE

## 2018-06-21 RX ORDER — SIMVASTATIN 40 MG/1
40 TABLET, FILM COATED ORAL NIGHTLY
Qty: 90 TABLET | Refills: 3 | Status: SHIPPED | OUTPATIENT
Start: 2018-06-21 | End: 2018-06-21 | Stop reason: SDUPTHER

## 2018-06-21 RX ORDER — SIMVASTATIN 40 MG/1
40 TABLET, FILM COATED ORAL NIGHTLY
Qty: 90 TABLET | Refills: 3 | Status: SHIPPED | OUTPATIENT
Start: 2018-06-21 | End: 2019-03-04 | Stop reason: SDUPTHER

## 2018-06-21 RX ORDER — METFORMIN HYDROCHLORIDE 1000 MG/1
1000 TABLET ORAL 2 TIMES DAILY WITH MEALS
Qty: 180 TABLET | Refills: 3 | Status: SHIPPED | OUTPATIENT
Start: 2018-06-21 | End: 2019-03-04 | Stop reason: SDUPTHER

## 2018-06-21 RX ORDER — DAPAGLIFLOZIN 5 MG/1
10 TABLET, FILM COATED ORAL DAILY
Qty: 180 TABLET | Refills: 3 | Status: SHIPPED | OUTPATIENT
Start: 2018-06-21 | End: 2019-03-04 | Stop reason: SDUPTHER

## 2018-06-21 RX ORDER — LISINOPRIL 40 MG/1
40 TABLET ORAL EVERY MORNING
Qty: 90 TABLET | Refills: 3 | Status: SHIPPED | OUTPATIENT
Start: 2018-06-21 | End: 2019-03-04 | Stop reason: SDUPTHER

## 2018-06-21 RX ORDER — HYDROCHLOROTHIAZIDE 25 MG/1
25 TABLET ORAL DAILY
Qty: 90 TABLET | Refills: 3 | Status: SHIPPED | OUTPATIENT
Start: 2018-06-21 | End: 2019-03-04 | Stop reason: SDUPTHER
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
## 2018-06-22 DIAGNOSIS — E11.9 TYPE 2 DIABETES MELLITUS WITHOUT COMPLICATION, WITHOUT LONG-TERM CURRENT USE OF INSULIN: ICD-10-CM

## 2018-06-25 RX ORDER — METFORMIN HYDROCHLORIDE 1000 MG/1
TABLET ORAL
Qty: 180 TABLET | Refills: 0 | Status: SHIPPED | OUTPATIENT
Start: 2018-06-25

## 2018-06-27 NOTE — PROGRESS NOTES
I have reviewed the notes, assessments, and/or procedures performed, I concur with her/his documentation of Pete Castelan III.

## 2018-06-29 PROBLEM — R29.898 WEAKNESS OF RIGHT HAND: Status: RESOLVED | Noted: 2017-12-07 | Resolved: 2018-06-29

## 2018-06-29 PROBLEM — M25.531 PAIN IN RIGHT WRIST: Status: RESOLVED | Noted: 2017-12-07 | Resolved: 2018-06-29

## 2018-06-29 PROBLEM — M25.631 STIFFNESS OF RIGHT WRIST JOINT: Status: RESOLVED | Noted: 2017-12-07 | Resolved: 2018-06-29

## 2018-07-09 DIAGNOSIS — E11.9 TYPE 2 DIABETES MELLITUS WITHOUT COMPLICATION, WITHOUT LONG-TERM CURRENT USE OF INSULIN: ICD-10-CM

## 2018-07-10 NOTE — TELEPHONE ENCOUNTER
----- Message from Narciso Freitas sent at 7/10/2018  4:10 PM CDT -----  PT CALL NEED REFILL ON ONEMateriaUCH ULTRA BLUE TEST STRIP Strp, HE IS COMPLETELY OUT WAS DR CYRUS WAITE

## 2018-07-11 DIAGNOSIS — E11.9 TYPE 2 DIABETES MELLITUS WITHOUT COMPLICATION, WITHOUT LONG-TERM CURRENT USE OF INSULIN: ICD-10-CM

## 2018-07-11 NOTE — TELEPHONE ENCOUNTER
----- Message from Beti Morales MA sent at 7/11/2018 10:28 AM CDT -----  Patient is out of his ONETOUCH ULTRA BLUE TEST STRIP Strp.  Please send to [facility] on [address] and advise patient when done.  Thanks.

## 2018-07-18 DIAGNOSIS — E11.9 TYPE 2 DIABETES MELLITUS WITHOUT COMPLICATION, WITHOUT LONG-TERM CURRENT USE OF INSULIN: ICD-10-CM

## 2018-07-18 NOTE — TELEPHONE ENCOUNTER
----- Message from Jennifer Kern sent at 7/10/2018  2:22 PM CDT -----  Patient needs a refill on ONETOUCH ULTRA BLUE TEST STRIP Strp is completely out

## 2018-07-31 ENCOUNTER — OFFICE VISIT (OUTPATIENT)
Dept: ORTHOPEDICS | Facility: CLINIC | Age: 43
End: 2018-07-31
Payer: COMMERCIAL

## 2018-07-31 VITALS
HEART RATE: 97 BPM | HEIGHT: 67 IN | WEIGHT: 247 LBS | SYSTOLIC BLOOD PRESSURE: 132 MMHG | DIASTOLIC BLOOD PRESSURE: 85 MMHG | BODY MASS INDEX: 38.77 KG/M2

## 2018-07-31 DIAGNOSIS — G56.03 BILATERAL CARPAL TUNNEL SYNDROME: Primary | ICD-10-CM

## 2018-07-31 DIAGNOSIS — G56.20 ULNAR NERVE ENTRAPMENT, UNSPECIFIED LATERALITY: ICD-10-CM

## 2018-07-31 PROCEDURE — 3008F BODY MASS INDEX DOCD: CPT | Mod: CPTII,S$GLB,, | Performed by: PHYSICIAN ASSISTANT

## 2018-07-31 PROCEDURE — 99213 OFFICE O/P EST LOW 20 MIN: CPT | Mod: S$GLB,,, | Performed by: PHYSICIAN ASSISTANT

## 2018-07-31 PROCEDURE — 3075F SYST BP GE 130 - 139MM HG: CPT | Mod: CPTII,S$GLB,, | Performed by: PHYSICIAN ASSISTANT

## 2018-07-31 PROCEDURE — 3079F DIAST BP 80-89 MM HG: CPT | Mod: CPTII,S$GLB,, | Performed by: PHYSICIAN ASSISTANT

## 2018-07-31 PROCEDURE — 99999 PR PBB SHADOW E&M-EST. PATIENT-LVL III: CPT | Mod: PBBFAC,,, | Performed by: PHYSICIAN ASSISTANT

## 2018-07-31 NOTE — PROGRESS NOTES
"Subjective:      Patient ID: Pete Castelan III is a 43 y.o. male.    Chief Complaint: Pain of the Right Hand      HPI  Pete Castelan III is a  43 y.o. male presenting today for follow up of finger numbness and tingling.  He reports that he has noticed fewer symptoms of numbness and tingling since his last visit. He has also been using the carpal tunnel braces "as needed."  He reports some days where he does not have any numbness or tingling.  He has been performing carpal tunnel stretches.  He reports that his pain is improved.       Review of patient's allergies indicates:   Allergen Reactions    Penicillins      Was told by his Mother that is allergic          Current Outpatient Prescriptions   Medication Sig Dispense Refill    acetaminophen (TYLENOL) 500 MG tablet Take 1,000 mg by mouth every 6 (six) hours as needed for Pain.      ascorbic acid, vitamin C, (VITAMIN C) 500 MG tablet Take 500 mg by mouth every morning.      blood sugar diagnostic (ONETOUCH ULTRA BLUE TEST STRIP) Strp Test once a day 100 strip 2    dapagliflozin (FARXIGA) 5 mg Tab tablet Take 2 tablets (10 mg total) by mouth once daily. 180 tablet 3    hydroCHLOROthiazide (HYDRODIURIL) 25 MG tablet Take 1 tablet (25 mg total) by mouth once daily. 90 tablet 3    hydrocodone-acetaminophen 5-325mg (NORCO) 5-325 mg per tablet Take 1 tablet by mouth every 4 (four) hours as needed for Pain. 18 tablet 0    lisinopril (PRINIVIL,ZESTRIL) 40 MG tablet Take 1 tablet (40 mg total) by mouth every morning. 90 tablet 3    metFORMIN (GLUCOPHAGE) 1000 MG tablet Take 1 tablet (1,000 mg total) by mouth 2 (two) times daily with meals. 180 tablet 3    metFORMIN (GLUCOPHAGE) 1000 MG tablet TAKE 1 TABLET(1000 MG) BY MOUTH TWICE DAILY WITH MEALS 180 tablet 0    oxyCODONE-acetaminophen (PERCOCET) 5-325 mg per tablet Take 1 tablet by mouth every 4 (four) hours as needed for Pain. 45 tablet 0    simvastatin (ZOCOR) 40 MG tablet Take 1 tablet " "(40 mg total) by mouth every evening. 90 tablet 3     No current facility-administered medications for this visit.        Past Medical History:   Diagnosis Date    Diabetes mellitus     Hyperlipidemia associated with type 2 diabetes mellitus     Hypertension        Past Surgical History:   Procedure Laterality Date    WRIST FRACTURE SURGERY Right 2017    ORIF, radius         Review of Systems:  Review of Systems   Constitution: Negative for chills and fever.   Skin: Negative for rash and suspicious lesions.   Musculoskeletal:        See HPI   Neurological: Negative for dizziness, headaches, light-headedness, numbness and paresthesias.   Psychiatric/Behavioral: Negative for depression. The patient is not nervous/anxious.          OBJECTIVE:     PHYSICAL EXAM:  Height: 5' 7" (170.2 cm) Weight: 112 kg (247 lb)     Vitals:    18 1543   BP: 132/85   Pulse: 97     General    Vitals reviewed.  Constitutional: He is oriented to person, place, and time. He appears well-developed and well-nourished.   HENT:   Head: Normocephalic and atraumatic.   Neck: Normal range of motion.   Cardiovascular: Normal rate.    Pulmonary/Chest: Effort normal. No respiratory distress.   Neurological: He is alert and oriented to person, place, and time.   Psychiatric: He has a normal mood and affect. His behavior is normal. Judgment and thought content normal.             Musculoskeletal: Well-healed surgical scar volar right wrist.  No significant edema noted bilaterally.  Nontender to palpation.  Good finger wrist and elbow range of motion. Bilaterally Tinel's at the cubital tunnel and over Guyon's canal both positive, also positive Tinel's at the carpal tunnel.  Negative Dennis's bilaterally. Patient experienced numbness on the palmar and dorsal aspects of the ulnar hand with Tinel's.  Neurovascularly intact-good sensation and motor function, good capillary refill, 2+ radial pulses.    EM2018  Impression   This study is " abnormal. There is electrophysiologic evidence for:   1. Chronic, bilateral median neuropathies at the wrist (carpal tunnel syndrome); and   2. Chronic, bilateral, ulnar neuropathies, worse on the left.     ASSESSMENT/PLAN:   Pete was seen today for pain.    Diagnoses and all orders for this visit:    Bilateral carpal tunnel syndrome    Ulnar nerve entrapment, unspecified laterality        - Continue bracing - regular nighttime bracing while he continues to have intermittent symptoms.  - Discussed treatment options  - Nerve glides handout provided  - Follow up in 3 months if symptoms persistent/not improved with bracing and nerve glides

## 2018-11-05 DIAGNOSIS — E11.9 TYPE 2 DIABETES MELLITUS WITHOUT COMPLICATION, UNSPECIFIED WHETHER LONG TERM INSULIN USE: Primary | ICD-10-CM

## 2018-11-06 ENCOUNTER — DOCUMENTATION ONLY (OUTPATIENT)
Dept: ORTHOPEDICS | Facility: CLINIC | Age: 43
End: 2018-11-06

## 2018-12-06 ENCOUNTER — TELEPHONE (OUTPATIENT)
Dept: FAMILY MEDICINE | Facility: CLINIC | Age: 43
End: 2018-12-06

## 2019-03-04 ENCOUNTER — OFFICE VISIT (OUTPATIENT)
Dept: FAMILY MEDICINE | Facility: HOSPITAL | Age: 44
End: 2019-03-04
Attending: FAMILY MEDICINE
Payer: COMMERCIAL

## 2019-03-04 VITALS
HEIGHT: 67 IN | HEART RATE: 91 BPM | BODY MASS INDEX: 37.58 KG/M2 | SYSTOLIC BLOOD PRESSURE: 136 MMHG | DIASTOLIC BLOOD PRESSURE: 81 MMHG | WEIGHT: 239.44 LBS

## 2019-03-04 DIAGNOSIS — E78.5 HYPERLIPIDEMIA, UNSPECIFIED HYPERLIPIDEMIA TYPE: ICD-10-CM

## 2019-03-04 DIAGNOSIS — E11.9 TYPE 2 DIABETES MELLITUS WITHOUT COMPLICATION, WITHOUT LONG-TERM CURRENT USE OF INSULIN: Primary | ICD-10-CM

## 2019-03-04 DIAGNOSIS — I10 ESSENTIAL HYPERTENSION: ICD-10-CM

## 2019-03-04 PROCEDURE — 99213 OFFICE O/P EST LOW 20 MIN: CPT | Performed by: FAMILY MEDICINE

## 2019-03-04 RX ORDER — HYDROCHLOROTHIAZIDE 25 MG/1
25 TABLET ORAL DAILY
Qty: 90 TABLET | Refills: 3 | Status: SHIPPED | OUTPATIENT
Start: 2019-03-04

## 2019-03-04 RX ORDER — SIMVASTATIN 40 MG/1
40 TABLET, FILM COATED ORAL NIGHTLY
Qty: 90 TABLET | Refills: 3 | Status: SHIPPED | OUTPATIENT
Start: 2019-03-04 | End: 2020-03-03

## 2019-03-04 RX ORDER — LISINOPRIL 40 MG/1
40 TABLET ORAL EVERY MORNING
Qty: 90 TABLET | Refills: 3 | Status: SHIPPED | OUTPATIENT
Start: 2019-03-04 | End: 2020-03-03

## 2019-03-04 RX ORDER — DAPAGLIFLOZIN 5 MG/1
10 TABLET, FILM COATED ORAL DAILY
Qty: 180 TABLET | Refills: 3 | Status: SHIPPED | OUTPATIENT
Start: 2019-03-04 | End: 2019-03-25

## 2019-03-04 RX ORDER — METFORMIN HYDROCHLORIDE 1000 MG/1
1000 TABLET ORAL 2 TIMES DAILY WITH MEALS
Qty: 180 TABLET | Refills: 3 | Status: SHIPPED | OUTPATIENT
Start: 2019-03-04

## 2019-03-04 NOTE — PROGRESS NOTES
Subjective:       Patient ID: Pete Castelan III is a 43 y.o. male.    Chief Complaint: DM2 follow up    HPI   42yo AA M with pmhx of T2DM, HTN, and HLD presents to have his medications refilled. He has been compliant with his meds and says everything has been stable. He has been checking them at home and they have been around 180's. He has been working on diet and exercise. He tries to exercise at least 2x a week. Denies N/V/F/C/CP/SOB.     Review of Systems   Constitutional: Negative.    HENT: Negative.    Eyes: Negative.    Respiratory: Negative.    Cardiovascular: Negative.    Gastrointestinal: Negative.    Endocrine: Negative.    Genitourinary: Negative.    Musculoskeletal: Negative.    Neurological: Negative.    Psychiatric/Behavioral: Negative.        Objective:      Vitals:    03/04/19 1021   BP: 136/81   Pulse: 91     Physical Exam   Constitutional: He is oriented to person, place, and time. He appears well-developed and well-nourished. No distress.   HENT:   Head: Normocephalic and atraumatic.   Nose: Nose normal.   Mouth/Throat: No oropharyngeal exudate.   Eyes: Conjunctivae are normal. Right eye exhibits no discharge. Left eye exhibits no discharge.   Neck: Normal range of motion. Neck supple. No JVD present. No tracheal deviation present.   Cardiovascular: Normal rate, regular rhythm, normal heart sounds and intact distal pulses. Exam reveals no gallop and no friction rub.   No murmur heard.  Pulmonary/Chest: Effort normal and breath sounds normal. No stridor. No respiratory distress. He has no wheezes. He has no rales. He exhibits no tenderness.   Abdominal: Soft. Bowel sounds are normal. He exhibits no distension and no mass. There is no tenderness. There is no guarding.   Musculoskeletal: Normal range of motion. He exhibits no edema, tenderness or deformity.   Neurological: He is alert and oriented to person, place, and time.   Skin: Skin is warm and dry. No rash noted. He is not  diaphoretic. No erythema. No pallor.   Nursing note and vitals reviewed.      Assessment:       1. Type 2 diabetes mellitus without complication, without long-term current use of insulin    2. Hyperlipidemia, unspecified hyperlipidemia type    3. Essential hypertension        Plan:       Type 2 diabetes mellitus without complication, without long-term current use of insulin  -     metFORMIN (GLUCOPHAGE) 1000 MG tablet; Take 1 tablet (1,000 mg total) by mouth 2 (two) times daily with meals.  Dispense: 180 tablet; Refill: 3  -     dapagliflozin (FARXIGA) 5 mg Tab tablet; Take 2 tablets (10 mg total) by mouth once daily.  Dispense: 180 tablet; Refill: 3  -     blood sugar diagnostic (ONETOUCH ULTRA BLUE TEST STRIP) Strp; Test once a day  Dispense: 100 strip; Refill: 2  -     TSH; Future; Expected date: 03/04/2019  -     Lipid panel; Future; Expected date: 03/04/2019  -     Hemoglobin A1c; Future; Expected date: 03/04/2019  -     CBC auto differential; Future; Expected date: 03/04/2019  -     Comprehensive metabolic panel; Future; Expected date: 03/04/2019  -     Urinalysis; Future; Expected date: 03/04/2019  -     Microalbumin/creatinine urine ratio; Future; Expected date: 03/04/2019    Hyperlipidemia, unspecified hyperlipidemia type  -     simvastatin (ZOCOR) 40 MG tablet; Take 1 tablet (40 mg total) by mouth every evening.  Dispense: 90 tablet; Refill: 3  -     Lipid panel; Future; Expected date: 03/04/2019    Essential hypertension  -     lisinopril (PRINIVIL,ZESTRIL) 40 MG tablet; Take 1 tablet (40 mg total) by mouth every morning.  Dispense: 90 tablet; Refill: 3  -     hydroCHLOROthiazide (HYDRODIURIL) 25 MG tablet; Take 1 tablet (25 mg total) by mouth once daily.  Dispense: 90 tablet; Refill: 3  -     TSH; Future; Expected date: 03/04/2019  -     CBC auto differential; Future; Expected date: 03/04/2019  -     Comprehensive metabolic panel; Future; Expected date: 03/04/2019      Follow-up in about 4 weeks (around  4/1/2019).      3/4/2019 11:44 AM Adriel Jimenez M.D.

## 2019-03-25 ENCOUNTER — TELEPHONE (OUTPATIENT)
Dept: FAMILY MEDICINE | Facility: HOSPITAL | Age: 44
End: 2019-03-25

## 2019-03-25 RX ORDER — DAPAGLIFLOZIN 10 MG/1
10 TABLET, FILM COATED ORAL DAILY
Qty: 30 TABLET | Refills: 2 | Status: SHIPPED | OUTPATIENT
Start: 2019-03-25

## 2019-03-25 NOTE — TELEPHONE ENCOUNTER
Meds changed dosage for insurance approval. Sent to the pharmacy and pt notified via voicemail.       3/25/2019 10:50 AM Adriel Jimenez M.D.

## 2020-05-07 DIAGNOSIS — E11.9 TYPE 2 DIABETES MELLITUS WITHOUT COMPLICATION: ICD-10-CM

## 2020-05-12 ENCOUNTER — PATIENT MESSAGE (OUTPATIENT)
Dept: ADMINISTRATIVE | Facility: HOSPITAL | Age: 45
End: 2020-05-12

## 2025-01-10 NOTE — TELEPHONE ENCOUNTER
----- Message from Barron Stephens sent at 11/22/2017 10:22 AM CST -----  Contact: Pt  x_  1st Request  _  2nd Request  _  3rd Request        Who: DANILO GROSS [[id number]]  Why: Requesting a call back in regards to obtaining clarification regarding physical therapy orders. Pt states that he attempted to schedule therapy as advised by physician, however, PT has no orders.    What Number to Call Back:[phone]      When to Expect a call back: (Within 24 hours)    Please return the call at earliest convenience. Thanks!                             97

## (undated) DEVICE — DRESSING SPONGE 16PLY 4X4 NS

## (undated) DEVICE — BIT DRILL QUICK RELEASE 2.0MM

## (undated) DEVICE — DRESSING N ADH OIL EMUL 3X3

## (undated) DEVICE — GUIDEWIRE ORTHO .054 X 6 IN
Type: IMPLANTABLE DEVICE | Site: WRIST | Status: NON-FUNCTIONAL
Removed: 2017-11-01

## (undated) DEVICE — DRAPE STERI-DRAPE 1000 17X11IN

## (undated) DEVICE — DRESSING ADAPTIC TOUCH 3X4

## (undated) DEVICE — IMMOBILIZER HAND

## (undated) DEVICE — SYS CLSR DERMABOND PRINEO 22CM

## (undated) DEVICE — SOL WATER STRL IRR 1000ML

## (undated) DEVICE — SCREW BONE CORTICAL THRD 2.3X2
Type: IMPLANTABLE DEVICE | Site: WRIST | Status: NON-FUNCTIONAL
Removed: 2017-11-01

## (undated) DEVICE — TRAY MINOR ORTHO

## (undated) DEVICE — NDL N SERIES MICRO-DISSECTION

## (undated) DEVICE — BANDAGE ELASTIC ACE 2IN 10/CA

## (undated) DEVICE — SEE MEDLINE ITEM 146270

## (undated) DEVICE — SEE MEDLINE ITEM 146268

## (undated) DEVICE — TOURNIQUET SB QC DP 18X4IN

## (undated) DEVICE — ELECTRODE REM PLYHSV RETURN 9

## (undated) DEVICE — SEE MEDLINE ITEM 157173

## (undated) DEVICE — BANDAGE CONFORM 3IN STRL

## (undated) DEVICE — BIT DRILL 2.8MM DIA

## (undated) DEVICE — GAUZE SPONGE 4X4 12PLY